# Patient Record
Sex: MALE | Race: ASIAN | NOT HISPANIC OR LATINO | Employment: FULL TIME | URBAN - METROPOLITAN AREA
[De-identification: names, ages, dates, MRNs, and addresses within clinical notes are randomized per-mention and may not be internally consistent; named-entity substitution may affect disease eponyms.]

---

## 2021-06-03 ENCOUNTER — RECORDS - HEALTHEAST (OUTPATIENT)
Dept: ADMINISTRATIVE | Facility: CLINIC | Age: 36
End: 2021-06-03

## 2022-12-10 ENCOUNTER — HOSPITAL ENCOUNTER (EMERGENCY)
Age: 37
Discharge: HOME | End: 2022-12-11
Attending: EMERGENCY MEDICINE

## 2022-12-10 DIAGNOSIS — R00.2 PALPITATIONS: Primary | ICD-10-CM

## 2022-12-10 LAB
ALBUMIN SERPL-MCNC: 4.4 G/DL (ref 3.6–5.1)
ALBUMIN/GLOB SERPL: 1.3 {RATIO} (ref 1–2.4)
ALP SERPL-CCNC: 52 UNITS/L (ref 45–117)
ALT SERPL-CCNC: 33 UNITS/L
ANION GAP SERPL CALC-SCNC: 11 MMOL/L (ref 7–19)
ANION GAP SERPL CALC-SCNC: 14 MMOL/L (ref 7–19)
AST SERPL-CCNC: 17 UNITS/L
ATRIAL RATE (BPM): 105
BASOPHILS # BLD: 0 K/MCL (ref 0–0.3)
BASOPHILS NFR BLD: 0 %
BILIRUB SERPL-MCNC: 0.6 MG/DL (ref 0.2–1)
BUN SERPL-MCNC: 21 MG/DL (ref 6–20)
BUN SERPL-MCNC: 22 MG/DL (ref 6–20)
BUN/CREAT SERPL: 31 (ref 7–25)
BUN/CREAT SERPL: 31 (ref 7–25)
CALCIUM SERPL-MCNC: 8.4 MG/DL (ref 8.4–10.2)
CALCIUM SERPL-MCNC: 8.5 MG/DL (ref 8.4–10.2)
CHLORIDE SERPL-SCNC: 103 MMOL/L (ref 97–110)
CHLORIDE SERPL-SCNC: 105 MMOL/L (ref 97–110)
CO2 SERPL-SCNC: 27 MMOL/L (ref 21–32)
CO2 SERPL-SCNC: 28 MMOL/L (ref 21–32)
CREAT SERPL-MCNC: 0.68 MG/DL (ref 0.67–1.17)
CREAT SERPL-MCNC: 0.7 MG/DL (ref 0.67–1.17)
DEPRECATED RDW RBC: 37.4 FL (ref 39–50)
DIASTOLIC BLOOD PRESSURE: 81
EOSINOPHIL # BLD: 0.2 K/MCL (ref 0–0.5)
EOSINOPHIL NFR BLD: 2 %
ERYTHROCYTE [DISTWIDTH] IN BLOOD: 12.4 % (ref 11–15)
FASTING DURATION TIME PATIENT: ABNORMAL H
FASTING DURATION TIME PATIENT: ABNORMAL H
GFR SERPLBLD BASED ON 1.73 SQ M-ARVRAT: >90 ML/MIN
GFR SERPLBLD BASED ON 1.73 SQ M-ARVRAT: >90 ML/MIN
GLOBULIN SER-MCNC: 3.5 G/DL (ref 2–4)
GLUCOSE SERPL-MCNC: 217 MG/DL (ref 70–99)
GLUCOSE SERPL-MCNC: 230 MG/DL (ref 70–99)
HCT VFR BLD CALC: 46.2 % (ref 39–51)
HGB BLD-MCNC: 15.6 G/DL (ref 13–17)
IMM GRANULOCYTES # BLD AUTO: 0.1 K/MCL (ref 0–0.2)
IMM GRANULOCYTES # BLD: 1 %
LYMPHOCYTES # BLD: 3.3 K/MCL (ref 1–4.8)
LYMPHOCYTES NFR BLD: 48 %
MAGNESIUM SERPL-MCNC: 1.9 MG/DL (ref 1.7–2.4)
MCH RBC QN AUTO: 28.4 PG (ref 26–34)
MCHC RBC AUTO-ENTMCNC: 33.8 G/DL (ref 32–36.5)
MCV RBC AUTO: 84.2 FL (ref 78–100)
MONOCYTES # BLD: 0.4 K/MCL (ref 0.3–0.9)
MONOCYTES NFR BLD: 5 %
NEUTROPHILS # BLD: 3 K/MCL (ref 1.8–7.7)
NEUTROPHILS NFR BLD: 44 %
NRBC BLD MANUAL-RTO: 0 /100 WBC
P AXIS (DEGREES): 45
PLATELET # BLD AUTO: 158 K/MCL (ref 140–450)
POTASSIUM SERPL-SCNC: 2.6 MMOL/L (ref 3.4–5.1)
POTASSIUM SERPL-SCNC: 3.9 MMOL/L (ref 3.4–5.1)
POTASSIUM SERPL-SCNC: 4.2 MMOL/L (ref 3.4–5.1)
PR-INTERVAL (MSEC): 186
PROT SERPL-MCNC: 7.9 G/DL (ref 6.4–8.2)
QRS-INTERVAL (MSEC): 90
QT-INTERVAL (MSEC): 344
QTC: 455
R AXIS (DEGREES): 31
RBC # BLD: 5.49 MIL/MCL (ref 4.5–5.9)
REPORT TEXT: NORMAL
SODIUM SERPL-SCNC: 140 MMOL/L (ref 135–145)
SODIUM SERPL-SCNC: 141 MMOL/L (ref 135–145)
SYSTOLIC BLOOD PRESSURE: 142
T AXIS (DEGREES): 35
TROPONIN I SERPL DL<=0.01 NG/ML-MCNC: 5 NG/L
VENTRICULAR RATE EKG/MIN (BPM): 105
WBC # BLD: 6.8 K/MCL (ref 4.2–11)

## 2022-12-10 PROCEDURE — 93005 ELECTROCARDIOGRAM TRACING: CPT | Performed by: PHYSICIAN ASSISTANT

## 2022-12-10 PROCEDURE — 36415 COLL VENOUS BLD VENIPUNCTURE: CPT | Performed by: PHYSICIAN ASSISTANT

## 2022-12-10 PROCEDURE — 84484 ASSAY OF TROPONIN QUANT: CPT | Performed by: EMERGENCY MEDICINE

## 2022-12-10 PROCEDURE — 85025 COMPLETE CBC W/AUTO DIFF WBC: CPT | Performed by: EMERGENCY MEDICINE

## 2022-12-10 PROCEDURE — 96361 HYDRATE IV INFUSION ADD-ON: CPT

## 2022-12-10 PROCEDURE — 80053 COMPREHEN METABOLIC PANEL: CPT | Performed by: EMERGENCY MEDICINE

## 2022-12-10 PROCEDURE — 83735 ASSAY OF MAGNESIUM: CPT | Performed by: PHYSICIAN ASSISTANT

## 2022-12-10 PROCEDURE — 84132 ASSAY OF SERUM POTASSIUM: CPT | Performed by: PHYSICIAN ASSISTANT

## 2022-12-10 PROCEDURE — 10002807 HB RX 258: Performed by: PHYSICIAN ASSISTANT

## 2022-12-10 PROCEDURE — 36415 COLL VENOUS BLD VENIPUNCTURE: CPT

## 2022-12-10 PROCEDURE — 96360 HYDRATION IV INFUSION INIT: CPT

## 2022-12-10 PROCEDURE — 80048 BASIC METABOLIC PNL TOTAL CA: CPT | Performed by: PHYSICIAN ASSISTANT

## 2022-12-10 PROCEDURE — 99285 EMERGENCY DEPT VISIT HI MDM: CPT

## 2022-12-10 RX ORDER — SODIUM CHLORIDE AND POTASSIUM CHLORIDE 150; 900 MG/100ML; MG/100ML
INJECTION, SOLUTION INTRAVENOUS ONCE
Status: DISCONTINUED | OUTPATIENT
Start: 2022-12-10 | End: 2022-12-11 | Stop reason: HOSPADM

## 2022-12-10 RX ADMIN — SODIUM CHLORIDE 1000 ML: 9 INJECTION, SOLUTION INTRAVENOUS at 21:20

## 2022-12-10 ASSESSMENT — PAIN DESCRIPTION - PAIN TYPE: TYPE: CHEST PAIN

## 2022-12-10 ASSESSMENT — ENCOUNTER SYMPTOMS
HEADACHES: 0
VOMITING: 0
ABDOMINAL DISTENTION: 0
DIARRHEA: 0
SORE THROAT: 0
BACK PAIN: 0
DIZZINESS: 0
COUGH: 0
TROUBLE SWALLOWING: 0
SHORTNESS OF BREATH: 0
FEVER: 0
NAUSEA: 0
LIGHT-HEADEDNESS: 0
PHOTOPHOBIA: 0
ABDOMINAL PAIN: 0

## 2022-12-10 ASSESSMENT — PAIN SCALES - GENERAL: PAINLEVEL_OUTOF10: 8

## 2022-12-11 VITALS
TEMPERATURE: 97.3 F | HEART RATE: 92 BPM | RESPIRATION RATE: 14 BRPM | OXYGEN SATURATION: 98 % | SYSTOLIC BLOOD PRESSURE: 136 MMHG | DIASTOLIC BLOOD PRESSURE: 82 MMHG

## 2022-12-11 LAB
RAINBOW EXTRA TUBES HOLD SPECIMEN: NORMAL

## 2023-12-29 ENCOUNTER — APPOINTMENT (OUTPATIENT)
Dept: CT IMAGING | Facility: HOSPITAL | Age: 38
End: 2023-12-29
Attending: EMERGENCY MEDICINE
Payer: COMMERCIAL

## 2023-12-29 ENCOUNTER — HOSPITAL ENCOUNTER (EMERGENCY)
Facility: HOSPITAL | Age: 38
Discharge: HOME | End: 2023-12-29
Attending: EMERGENCY MEDICINE | Admitting: EMERGENCY MEDICINE
Payer: COMMERCIAL

## 2023-12-29 VITALS
TEMPERATURE: 97.3 F | DIASTOLIC BLOOD PRESSURE: 86 MMHG | BODY MASS INDEX: 25.23 KG/M2 | WEIGHT: 157 LBS | HEIGHT: 66 IN | OXYGEN SATURATION: 99 % | HEART RATE: 82 BPM | SYSTOLIC BLOOD PRESSURE: 144 MMHG | RESPIRATION RATE: 16 BRPM

## 2023-12-29 DIAGNOSIS — K61.1 PERIRECTAL ABSCESS: ICD-10-CM

## 2023-12-29 LAB
ANION GAP SERPL CALCULATED.3IONS-SCNC: 11 MMOL/L (ref 7–15)
BUN SERPL-MCNC: 13.6 MG/DL (ref 6–20)
CALCIUM SERPL-MCNC: 9.3 MG/DL (ref 8.6–10)
CHLORIDE SERPL-SCNC: 100 MMOL/L (ref 98–107)
CREAT SERPL-MCNC: 0.61 MG/DL (ref 0.67–1.17)
DEPRECATED HCO3 PLAS-SCNC: 26 MMOL/L (ref 22–29)
EGFRCR SERPLBLD CKD-EPI 2021: >90 ML/MIN/1.73M2
ERYTHROCYTE [DISTWIDTH] IN BLOOD BY AUTOMATED COUNT: 11.9 % (ref 10–15)
GLUCOSE SERPL-MCNC: 264 MG/DL (ref 70–99)
HCT VFR BLD AUTO: 46.9 % (ref 40–53)
HGB BLD-MCNC: 16.4 G/DL (ref 13.3–17.7)
LACTATE SERPL-SCNC: 1.1 MMOL/L (ref 0.7–2)
MCH RBC QN AUTO: 28.3 PG (ref 26.5–33)
MCHC RBC AUTO-ENTMCNC: 35 G/DL (ref 31.5–36.5)
MCV RBC AUTO: 81 FL (ref 78–100)
PLATELET # BLD AUTO: 163 10E3/UL (ref 150–450)
POTASSIUM SERPL-SCNC: 3.7 MMOL/L (ref 3.4–5.3)
RBC # BLD AUTO: 5.8 10E6/UL (ref 4.4–5.9)
SODIUM SERPL-SCNC: 137 MMOL/L (ref 135–145)
WBC # BLD AUTO: 8.1 10E3/UL (ref 4–11)

## 2023-12-29 PROCEDURE — 72193 CT PELVIS W/DYE: CPT

## 2023-12-29 PROCEDURE — 83605 ASSAY OF LACTIC ACID: CPT | Performed by: EMERGENCY MEDICINE

## 2023-12-29 PROCEDURE — 258N000003 HC RX IP 258 OP 636: Performed by: EMERGENCY MEDICINE

## 2023-12-29 PROCEDURE — 250N000013 HC RX MED GY IP 250 OP 250 PS 637: Performed by: EMERGENCY MEDICINE

## 2023-12-29 PROCEDURE — 36415 COLL VENOUS BLD VENIPUNCTURE: CPT | Performed by: EMERGENCY MEDICINE

## 2023-12-29 PROCEDURE — 99285 EMERGENCY DEPT VISIT HI MDM: CPT | Mod: 25

## 2023-12-29 PROCEDURE — 250N000011 HC RX IP 250 OP 636: Performed by: EMERGENCY MEDICINE

## 2023-12-29 PROCEDURE — 80048 BASIC METABOLIC PNL TOTAL CA: CPT | Performed by: EMERGENCY MEDICINE

## 2023-12-29 PROCEDURE — 85027 COMPLETE CBC AUTOMATED: CPT | Performed by: EMERGENCY MEDICINE

## 2023-12-29 RX ORDER — IBUPROFEN 600 MG/1
600 TABLET, FILM COATED ORAL EVERY 8 HOURS PRN
Qty: 30 TABLET | Refills: 0 | Status: SHIPPED | OUTPATIENT
Start: 2023-12-29

## 2023-12-29 RX ORDER — IOPAMIDOL 755 MG/ML
90 INJECTION, SOLUTION INTRAVASCULAR ONCE
Status: COMPLETED | OUTPATIENT
Start: 2023-12-29 | End: 2023-12-29

## 2023-12-29 RX ORDER — LEVOFLOXACIN 500 MG/1
500 TABLET, FILM COATED ORAL ONCE
Status: DISCONTINUED | OUTPATIENT
Start: 2023-12-29 | End: 2023-12-29

## 2023-12-29 RX ORDER — AMOXICILLIN 250 MG
1 CAPSULE ORAL 2 TIMES DAILY
Qty: 15 TABLET | Refills: 0 | Status: SHIPPED | OUTPATIENT
Start: 2023-12-29 | End: 2024-01-06

## 2023-12-29 RX ORDER — HYDROCODONE BITARTRATE AND ACETAMINOPHEN 5; 325 MG/1; MG/1
1 TABLET ORAL EVERY 6 HOURS PRN
Qty: 10 TABLET | Refills: 0 | Status: ON HOLD | OUTPATIENT
Start: 2023-12-29 | End: 2023-12-30

## 2023-12-29 RX ORDER — MORPHINE SULFATE 4 MG/ML
4 INJECTION, SOLUTION INTRAMUSCULAR; INTRAVENOUS ONCE
Status: COMPLETED | OUTPATIENT
Start: 2023-12-29 | End: 2023-12-29

## 2023-12-29 RX ADMIN — IOPAMIDOL 90 ML: 755 INJECTION, SOLUTION INTRAVENOUS at 04:01

## 2023-12-29 RX ADMIN — SODIUM CHLORIDE 1000 ML: 9 INJECTION, SOLUTION INTRAVENOUS at 03:29

## 2023-12-29 RX ADMIN — MORPHINE SULFATE 4 MG: 4 INJECTION, SOLUTION INTRAMUSCULAR; INTRAVENOUS at 03:31

## 2023-12-29 RX ADMIN — AMOXICILLIN AND CLAVULANATE POTASSIUM 1 TABLET: 875; 125 TABLET, FILM COATED ORAL at 05:08

## 2023-12-29 ASSESSMENT — ACTIVITIES OF DAILY LIVING (ADL)
ADLS_ACUITY_SCORE: 33
ADLS_ACUITY_SCORE: 35

## 2023-12-29 NOTE — ED PROVIDER NOTES
"EMERGENCY DEPARTMENT ENCOUNTER      NAME: Celia Telles  AGE: 38 year old male  YOB: 1985  MRN: [id number]  EVALUATION DATE & TIME: 12/29/2023  2:57 AM    PCP: No primary care provider on file.    ED PROVIDER: Dar Ferreira M.D.      Chief Complaint   Patient presents with    Rectal/perineal Pain         FINAL IMPRESSION:  Perirectal abscess      ED COURSE & MEDICAL DECISION MAKING:    Pertinent Labs & Imaging studies reviewed. (See chart for details)  38 year old male presents to the Emergency Department for evaluation of severe perirectal discomfort.  Symptoms ongoing for a number days.  Patient reports he had a \"golf ball sized\" swollen area which his wife punctured.  He states it drained a lot of blood and it felt improved only to have pain worsening over the last few days once again.  Patient states is very intense when he tries to sit in stool.  Reports only small amount of blood with stooling.  Does report a history of constipation.  On exam he is in moderate distress and appears to be quite uncomfortable.  Abdomen is soft and nontender respirations unlabored.  Perirectal exam reveals marked induration and tenderness along the left perirectal verge.  No obvious fluctuance.  No evidence of external hemorrhoids.  Findings consistent with perirectal abscess.  Given the extent of discomfort we will proceed with laboratory evaluation and imaging to determine need for potential surgical intervention/drainage.  Patient treated symptomatically with intravenous normal saline and morphine.. Patient appears non toxic with stable vitals signs.    3:14 AM  I met with the patient for the initial interview and physical examination. Discussed plan for treatment and workup in the ED.    4:48 AM.  Basic metabolic, CBC and lactic acid essentially unremarkable.  CT of the pelvis reveals approximately 1 x 2 cm perirectal abscess in the area of tenderness consistent with his exam.  This is deemed too small for drainage.  " Initial dose of antibiotics, Augmentin given.  The need for sitz bath's, pain management and antibiotics discussed at length.  Patient has any significant worsening he needs to return immediately.  Understands as well.  Patient ultimately placed on stool softeners to avoid constipation   at the conclusion of the encounter I discussed the results of all of the tests and the disposition. The questions were answered and return precautions provided. The patient or family acknowledged understanding and was agreeable with the care plan.     Medical Decision Making    History:  Supplemental history from: Documented in chart, if applicable  External Record(s) reviewed: Documented in chart, if applicable.    Work Up:  Chart documentation includes differential considered and any EKGs or imaging independently interpreted by provider, where specified.  In additional to work up documented, I considered the following work up: Documented in chart, if applicable.    External consultation:  Discussion of management with another provider: Documented in chart, if applicable    Complicating factors:  Care impacted by chronic illness: Diabetes  Care affected by social determinants of health: N/A    Disposition considerations: Discharge. I prescribed additional prescription strength medication(s) as charted. See documentation for any additional details.     PPE: Provider wore  paper mask.     MEDICATIONS GIVEN IN THE EMERGENCY:  Medications - No data to display    NEW PRESCRIPTIONS STARTED AT TODAY'S ER VISIT  Current Discharge Medication List        START taking these medications    Details   amoxicillin-clavulanate (AUGMENTIN) 875-125 MG tablet Take 1 tablet by mouth 2 times daily for 7 days  Qty: 14 tablet, Refills: 0      HYDROcodone-acetaminophen (NORCO) 5-325 MG tablet Take 1 tablet by mouth every 6 hours as needed  Qty: 10 tablet, Refills: 0      ibuprofen (ADVIL/MOTRIN) 600 MG tablet Take 1 tablet (600 mg) by mouth every 8 hours  "as needed  Qty: 30 tablet, Refills: 0      senna-docusate (SENOKOT-S/PERICOLACE) 8.6-50 MG tablet Take 1 tablet by mouth 2 times daily for 15 doses  Qty: 15 tablet, Refills: 0                 =================================================================    HPI    Patient information was obtained from: patient     Use of Intrepreter: N/A         Celia Telles is a 38 year old male with a pertient medical history of diabetes mellitus who presents to the ED for evaluation of rectal pain.     Patient reports 2 weeks of constant rectal pain with blood in his stool occasionally. Endorses feeling constipated. He has a history of external hemorrhoids, but usually they go away in 1 week. He has been using OTC medications with no relief.     REVIEW OF SYSTEMS   Constitutional:  Denies fever, chills  Respiratory:  Denies productive cough or increased work of breathing  Cardiovascular:  Denies chest pain, palpitations  GI:  Denies abdominal pain, nausea, vomiting, or change in bowel or bladder habits   Musculoskeletal:  Denies any new muscle/joint swelling  Skin:  Denies rash   Neurologic:  Denies focal weakness  All systems negative except as marked.     PAST MEDICAL HISTORY:  No past medical history on file.    PAST SURGICAL HISTORY:  No past surgical history on file.      CURRENT MEDICATIONS:    No current facility-administered medications for this encounter.  No current outpatient medications on file.    ALLERGIES:  No Known Allergies    FAMILY HISTORY:  No family history on file.    SOCIAL HISTORY:   Social History     Socioeconomic History    Marital status:        VITALS:  Patient Vitals for the past 24 hrs:   BP Temp Temp src Pulse Resp SpO2 Height Weight   12/29/23 0254 (!) 148/90 97.3  F (36.3  C) Temporal 82 16 98 % 1.676 m (5' 6\") 71.2 kg (157 lb)        PHYSICAL EXAM    Constitutional:  Awake, alert, in moderate apparent distress  HENT:  Normocephalic, Atraumatic. Bilateral external ears normal. Oropharynx " moist. Nose normal. Neck- Normal range of motion with no guarding, No midline cervical tenderness, Supple, No stridor.   Eyes:  PERRL, EOMI with no signs of entrapment, Conjunctiva normal, No discharge.   Respiratory:  Normal breath sounds, No respiratory distress, No wheezing.    Cardiovascular:  Normal heart rate, Normal rhythm, No appreciable rubs or gallops.   GI:  Soft, No tenderness, No distension, No palpable masses  : No external hemorrhoids. Area of firmness and exquisite tenderness on let-side perirectal region.  No fluctuance  Musculoskeletal:  No edema. Good range of motion in all major joints. No tenderness to palpation or major deformities noted.  Integument:  Warm, Dry, No erythema, No rash.   Neurologic:  Alert & oriented, Normal motor function, Normal sensory function, No focal deficits noted.   Psychiatric:  Affect normal, Judgment normal, Mood normal.   No external   Area firmness and tenderness left side perirectal region   LAB:  All pertinent labs reviewed and interpreted.     Results for orders placed or performed during the hospital encounter of 12/29/23   CT Pelvis Soft Tissue w Contrast     Status: None    Narrative    EXAM: CT PELVIS SOFT TISSUE W CONTRAST  LOCATION: [facility]  DATE: 12/29/2023    INDICATION: Perirectal abscess questionable    COMPARISON: None.  TECHNIQUE: CT scan of the pelvis was performed with IV contrast. Multiplanar reformats were obtained. Dose reduction techniques were used.  CONTRAST: 90ml Isovue 370    FINDINGS:    PELVIC ORGANS: Bladder is within normal limits. Visualized bowel is normal in caliber with no evidence for obstruction. Normal appendix. No intrapelvic masses or fluid collection.    There is a peripherally enhancing perianal fluid collection just to the left of midline near the anal opening measuring 1.8 x 1.0 x 1.4 cm (AP x transverse x SI). This is compatible with a perianal abscess. Cannot exclude fistulous communication  of the   anal canal. Subcutaneous thickening and induration along the gluteal fold on the left posterior to the abscess. No other fluid collections seen.    MUSCULOSKELETAL: No significant bony abnormalities.      Impression    IMPRESSION:  1.  1.8 cm perianal abscess just to the left of midline near the anal opening. Cannot exclude underlying fistulous communication to the internal canal. MRI could better evaluate if clinically warranted. There is some nonspecific subcutaneous thickening   and induration extending posteriorly along the left gluteal fold. No other fluid collections seen.    2.  Remainder of the exam is unremarkable.             Lactic acid whole blood     Status: Normal   Result Value Ref Range    Lactic Acid 1.1 0.7 - 2.0 mmol/L   Basic metabolic panel     Status: Abnormal   Result Value Ref Range    Sodium 137 135 - 145 mmol/L    Potassium 3.7 3.4 - 5.3 mmol/L    Chloride 100 98 - 107 mmol/L    Carbon Dioxide (CO2) 26 22 - 29 mmol/L    Anion Gap 11 7 - 15 mmol/L    Urea Nitrogen 13.6 6.0 - 20.0 mg/dL    Creatinine 0.61 (L) 0.67 - 1.17 mg/dL    GFR Estimate >90 >60 mL/min/1.73m2    Calcium 9.3 8.6 - 10.0 mg/dL    Glucose 264 (H) 70 - 99 mg/dL   CBC (+ platelets, no diff)     Status: Normal   Result Value Ref Range    WBC Count 8.1 4.0 - 11.0 10e3/uL    RBC Count 5.80 4.40 - 5.90 10e6/uL    Hemoglobin 16.4 13.3 - 17.7 g/dL    Hematocrit 46.9 40.0 - 53.0 %    MCV 81 78 - 100 fL    MCH 28.3 26.5 - 33.0 pg    MCHC 35.0 31.5 - 36.5 g/dL    RDW 11.9 10.0 - 15.0 %    Platelet Count 163 150 - 450 10e3/uL      RADIOLOGY:  Reviewed all pertinent imaging. Please see official radiology report.  No orders to display       PROCEDURES:   N/A        I, Criss Gonzalez, am serving as a scribe to document services personally performed by Dar Ferreira MD, based on my observation and the provider's statements to me. I, Dar Ferreira MD attest that Criss Gonzalez is acting in a scribe capacity, has observed my  performance of the services and has documented them in accordance with my direction.    Dar Ferreira M.D.  Emergency Medicine  [facility] EMERGENCY DEPARTMENT     Dar Ferreira MD  12/29/23 8702

## 2023-12-29 NOTE — ED TRIAGE NOTES
Patient arrives from home.   Reports having rectal pain x1 week which is making it difficult for him to walk.      States he does have internal hemorrhoids.

## 2023-12-30 ENCOUNTER — ANESTHESIA (OUTPATIENT)
Dept: SURGERY | Facility: HOSPITAL | Age: 38
End: 2023-12-30
Payer: COMMERCIAL

## 2023-12-30 ENCOUNTER — ANESTHESIA EVENT (OUTPATIENT)
Dept: SURGERY | Facility: HOSPITAL | Age: 38
End: 2023-12-30
Payer: COMMERCIAL

## 2023-12-30 ENCOUNTER — HOSPITAL ENCOUNTER (OUTPATIENT)
Facility: HOSPITAL | Age: 38
Discharge: HOME | End: 2023-12-30
Attending: EMERGENCY MEDICINE | Admitting: COLON & RECTAL SURGERY
Payer: COMMERCIAL

## 2023-12-30 VITALS
HEIGHT: 66 IN | SYSTOLIC BLOOD PRESSURE: 134 MMHG | TEMPERATURE: 99.2 F | HEART RATE: 75 BPM | BODY MASS INDEX: 25.23 KG/M2 | OXYGEN SATURATION: 94 % | DIASTOLIC BLOOD PRESSURE: 93 MMHG | WEIGHT: 157 LBS | RESPIRATION RATE: 21 BRPM

## 2023-12-30 DIAGNOSIS — K61.1 RECTAL ABSCESS: ICD-10-CM

## 2023-12-30 DIAGNOSIS — K61.0 PERIANAL ABSCESS: Primary | ICD-10-CM

## 2023-12-30 DIAGNOSIS — Z86.39 HISTORY OF DIABETES MELLITUS: ICD-10-CM

## 2023-12-30 LAB
ANION GAP SERPL CALCULATED.3IONS-SCNC: 10 MMOL/L (ref 7–15)
BASOPHILS # BLD AUTO: 0 10E3/UL (ref 0–0.2)
BASOPHILS NFR BLD AUTO: 0 %
BUN SERPL-MCNC: 16.3 MG/DL (ref 6–20)
CALCIUM SERPL-MCNC: 9.3 MG/DL (ref 8.6–10)
CHLORIDE SERPL-SCNC: 99 MMOL/L (ref 98–107)
CREAT SERPL-MCNC: 0.73 MG/DL (ref 0.67–1.17)
CRP SERPL-MCNC: 23.7 MG/L
DEPRECATED HCO3 PLAS-SCNC: 27 MMOL/L (ref 22–29)
EGFRCR SERPLBLD CKD-EPI 2021: >90 ML/MIN/1.73M2
EOSINOPHIL # BLD AUTO: 0.1 10E3/UL (ref 0–0.7)
EOSINOPHIL NFR BLD AUTO: 2 %
ERYTHROCYTE [DISTWIDTH] IN BLOOD BY AUTOMATED COUNT: 11.9 % (ref 10–15)
GLUCOSE BLDC GLUCOMTR-MCNC: 185 MG/DL (ref 70–99)
GLUCOSE BLDC GLUCOMTR-MCNC: 193 MG/DL (ref 70–99)
GLUCOSE SERPL-MCNC: 221 MG/DL (ref 70–99)
HCT VFR BLD AUTO: 49.3 % (ref 40–53)
HGB BLD-MCNC: 16.9 G/DL (ref 13.3–17.7)
IMM GRANULOCYTES # BLD: 0.1 10E3/UL
IMM GRANULOCYTES NFR BLD: 1 %
LACTATE SERPL-SCNC: 1 MMOL/L (ref 0.7–2)
LYMPHOCYTES # BLD AUTO: 1.6 10E3/UL (ref 0.8–5.3)
LYMPHOCYTES NFR BLD AUTO: 18 %
MCH RBC QN AUTO: 28.1 PG (ref 26.5–33)
MCHC RBC AUTO-ENTMCNC: 34.3 G/DL (ref 31.5–36.5)
MCV RBC AUTO: 82 FL (ref 78–100)
MONOCYTES # BLD AUTO: 0.6 10E3/UL (ref 0–1.3)
MONOCYTES NFR BLD AUTO: 7 %
NEUTROPHILS # BLD AUTO: 6.2 10E3/UL (ref 1.6–8.3)
NEUTROPHILS NFR BLD AUTO: 72 %
NRBC # BLD AUTO: 0 10E3/UL
NRBC BLD AUTO-RTO: 0 /100
PLATELET # BLD AUTO: 176 10E3/UL (ref 150–450)
POTASSIUM SERPL-SCNC: 4.1 MMOL/L (ref 3.4–5.3)
RBC # BLD AUTO: 6.02 10E6/UL (ref 4.4–5.9)
SODIUM SERPL-SCNC: 136 MMOL/L (ref 135–145)
WBC # BLD AUTO: 8.6 10E3/UL (ref 4–11)

## 2023-12-30 PROCEDURE — 83605 ASSAY OF LACTIC ACID: CPT | Performed by: EMERGENCY MEDICINE

## 2023-12-30 PROCEDURE — 360N000075 HC SURGERY LEVEL 2, PER MIN: Performed by: COLON & RECTAL SURGERY

## 2023-12-30 PROCEDURE — 272N000001 HC OR GENERAL SUPPLY STERILE: Performed by: COLON & RECTAL SURGERY

## 2023-12-30 PROCEDURE — 250N000011 HC RX IP 250 OP 636: Performed by: COLON & RECTAL SURGERY

## 2023-12-30 PROCEDURE — 999N000141 HC STATISTIC PRE-PROCEDURE NURSING ASSESSMENT: Performed by: COLON & RECTAL SURGERY

## 2023-12-30 PROCEDURE — 370N000017 HC ANESTHESIA TECHNICAL FEE, PER MIN: Performed by: COLON & RECTAL SURGERY

## 2023-12-30 PROCEDURE — 99285 EMERGENCY DEPT VISIT HI MDM: CPT | Mod: 25

## 2023-12-30 PROCEDURE — 85025 COMPLETE CBC W/AUTO DIFF WBC: CPT | Performed by: EMERGENCY MEDICINE

## 2023-12-30 PROCEDURE — 250N000009 HC RX 250: Performed by: STUDENT IN AN ORGANIZED HEALTH CARE EDUCATION/TRAINING PROGRAM

## 2023-12-30 PROCEDURE — 250N000011 HC RX IP 250 OP 636: Performed by: ANESTHESIOLOGY

## 2023-12-30 PROCEDURE — 250N000009 HC RX 250: Performed by: COLON & RECTAL SURGERY

## 2023-12-30 PROCEDURE — 250N000011 HC RX IP 250 OP 636: Performed by: STUDENT IN AN ORGANIZED HEALTH CARE EDUCATION/TRAINING PROGRAM

## 2023-12-30 PROCEDURE — 250N000013 HC RX MED GY IP 250 OP 250 PS 637: Performed by: ANESTHESIOLOGY

## 2023-12-30 PROCEDURE — 80048 BASIC METABOLIC PNL TOTAL CA: CPT | Performed by: EMERGENCY MEDICINE

## 2023-12-30 PROCEDURE — 258N000003 HC RX IP 258 OP 636: Performed by: EMERGENCY MEDICINE

## 2023-12-30 PROCEDURE — 250N000009 HC RX 250: Performed by: EMERGENCY MEDICINE

## 2023-12-30 PROCEDURE — 258N000003 HC RX IP 258 OP 636: Performed by: ANESTHESIOLOGY

## 2023-12-30 PROCEDURE — 96367 TX/PROPH/DG ADDL SEQ IV INF: CPT

## 2023-12-30 PROCEDURE — 250N000011 HC RX IP 250 OP 636: Performed by: SURGERY

## 2023-12-30 PROCEDURE — 710N000009 HC RECOVERY PHASE 1, LEVEL 1, PER MIN: Performed by: COLON & RECTAL SURGERY

## 2023-12-30 PROCEDURE — 36415 COLL VENOUS BLD VENIPUNCTURE: CPT | Performed by: EMERGENCY MEDICINE

## 2023-12-30 PROCEDURE — 250N000011 HC RX IP 250 OP 636: Performed by: EMERGENCY MEDICINE

## 2023-12-30 PROCEDURE — 96365 THER/PROPH/DIAG IV INF INIT: CPT | Mod: 59

## 2023-12-30 PROCEDURE — 710N000012 HC RECOVERY PHASE 2, PER MINUTE: Performed by: COLON & RECTAL SURGERY

## 2023-12-30 PROCEDURE — 86140 C-REACTIVE PROTEIN: CPT | Performed by: EMERGENCY MEDICINE

## 2023-12-30 PROCEDURE — 250N000025 HC SEVOFLURANE, PER MIN: Performed by: COLON & RECTAL SURGERY

## 2023-12-30 RX ORDER — HALOPERIDOL 5 MG/ML
1 INJECTION INTRAMUSCULAR
Status: DISCONTINUED | OUTPATIENT
Start: 2023-12-30 | End: 2023-12-30 | Stop reason: HOSPADM

## 2023-12-30 RX ORDER — LIDOCAINE HYDROCHLORIDE 20 MG/ML
10 JELLY TOPICAL ONCE
Status: COMPLETED | OUTPATIENT
Start: 2023-12-30 | End: 2023-12-30

## 2023-12-30 RX ORDER — ONDANSETRON 4 MG/1
4 TABLET, ORALLY DISINTEGRATING ORAL EVERY 30 MIN PRN
Status: DISCONTINUED | OUTPATIENT
Start: 2023-12-30 | End: 2023-12-30 | Stop reason: HOSPADM

## 2023-12-30 RX ORDER — ALBUTEROL SULFATE 0.83 MG/ML
2.5 SOLUTION RESPIRATORY (INHALATION) EVERY 4 HOURS PRN
Status: DISCONTINUED | OUTPATIENT
Start: 2023-12-30 | End: 2023-12-30 | Stop reason: HOSPADM

## 2023-12-30 RX ORDER — HYDROMORPHONE HYDROCHLORIDE 1 MG/ML
0.2 INJECTION, SOLUTION INTRAMUSCULAR; INTRAVENOUS; SUBCUTANEOUS ONCE
Status: COMPLETED | OUTPATIENT
Start: 2023-12-30 | End: 2023-12-30

## 2023-12-30 RX ORDER — NALOXONE HYDROCHLORIDE 0.4 MG/ML
0.4 INJECTION, SOLUTION INTRAMUSCULAR; INTRAVENOUS; SUBCUTANEOUS
Status: DISCONTINUED | OUTPATIENT
Start: 2023-12-30 | End: 2023-12-30 | Stop reason: HOSPADM

## 2023-12-30 RX ORDER — NALOXONE HYDROCHLORIDE 0.4 MG/ML
0.2 INJECTION, SOLUTION INTRAMUSCULAR; INTRAVENOUS; SUBCUTANEOUS
Status: DISCONTINUED | OUTPATIENT
Start: 2023-12-30 | End: 2023-12-30 | Stop reason: HOSPADM

## 2023-12-30 RX ORDER — MAGNESIUM HYDROXIDE 1200 MG/15ML
LIQUID ORAL PRN
Status: DISCONTINUED | OUTPATIENT
Start: 2023-12-30 | End: 2023-12-30 | Stop reason: HOSPADM

## 2023-12-30 RX ORDER — LIDOCAINE HYDROCHLORIDE 10 MG/ML
INJECTION, SOLUTION INFILTRATION; PERINEURAL PRN
Status: DISCONTINUED | OUTPATIENT
Start: 2023-12-30 | End: 2023-12-30

## 2023-12-30 RX ORDER — FENTANYL CITRATE 50 UG/ML
25 INJECTION, SOLUTION INTRAMUSCULAR; INTRAVENOUS
Status: DISCONTINUED | OUTPATIENT
Start: 2023-12-30 | End: 2023-12-30 | Stop reason: HOSPADM

## 2023-12-30 RX ORDER — ONDANSETRON 2 MG/ML
4 INJECTION INTRAMUSCULAR; INTRAVENOUS EVERY 30 MIN PRN
Status: DISCONTINUED | OUTPATIENT
Start: 2023-12-30 | End: 2023-12-30 | Stop reason: HOSPADM

## 2023-12-30 RX ORDER — SODIUM CHLORIDE 9 MG/ML
INJECTION, SOLUTION INTRAVENOUS ONCE
Status: COMPLETED | OUTPATIENT
Start: 2023-12-30 | End: 2023-12-30

## 2023-12-30 RX ORDER — ONDANSETRON 2 MG/ML
4 INJECTION INTRAMUSCULAR; INTRAVENOUS ONCE
Status: COMPLETED | OUTPATIENT
Start: 2023-12-30 | End: 2023-12-30

## 2023-12-30 RX ORDER — METRONIDAZOLE 500 MG/100ML
500 INJECTION, SOLUTION INTRAVENOUS ONCE
Qty: 100 ML | Refills: 0 | Status: COMPLETED | OUTPATIENT
Start: 2023-12-30 | End: 2023-12-30

## 2023-12-30 RX ORDER — CEFTRIAXONE 1 G/1
1 INJECTION, POWDER, FOR SOLUTION INTRAMUSCULAR; INTRAVENOUS ONCE
Qty: 10 ML | Refills: 0 | Status: COMPLETED | OUTPATIENT
Start: 2023-12-30 | End: 2023-12-30

## 2023-12-30 RX ORDER — FENTANYL CITRATE 50 UG/ML
INJECTION, SOLUTION INTRAMUSCULAR; INTRAVENOUS PRN
Status: DISCONTINUED | OUTPATIENT
Start: 2023-12-30 | End: 2023-12-30

## 2023-12-30 RX ORDER — ONDANSETRON 4 MG/1
4 TABLET, ORALLY DISINTEGRATING ORAL
Status: DISCONTINUED | OUTPATIENT
Start: 2023-12-30 | End: 2023-12-30 | Stop reason: HOSPADM

## 2023-12-30 RX ORDER — HYDROMORPHONE HYDROCHLORIDE 1 MG/ML
0.4 INJECTION, SOLUTION INTRAMUSCULAR; INTRAVENOUS; SUBCUTANEOUS EVERY 5 MIN PRN
Status: DISCONTINUED | OUTPATIENT
Start: 2023-12-30 | End: 2023-12-30 | Stop reason: HOSPADM

## 2023-12-30 RX ORDER — SODIUM CHLORIDE, SODIUM LACTATE, POTASSIUM CHLORIDE, CALCIUM CHLORIDE 600; 310; 30; 20 MG/100ML; MG/100ML; MG/100ML; MG/100ML
INJECTION, SOLUTION INTRAVENOUS CONTINUOUS
Status: DISCONTINUED | OUTPATIENT
Start: 2023-12-30 | End: 2023-12-30 | Stop reason: HOSPADM

## 2023-12-30 RX ORDER — ONDANSETRON 2 MG/ML
INJECTION INTRAMUSCULAR; INTRAVENOUS PRN
Status: DISCONTINUED | OUTPATIENT
Start: 2023-12-30 | End: 2023-12-30

## 2023-12-30 RX ORDER — DEXAMETHASONE SODIUM PHOSPHATE 4 MG/ML
INJECTION, SOLUTION INTRA-ARTICULAR; INTRALESIONAL; INTRAMUSCULAR; INTRAVENOUS; SOFT TISSUE PRN
Status: DISCONTINUED | OUTPATIENT
Start: 2023-12-30 | End: 2023-12-30

## 2023-12-30 RX ORDER — BUPIVACAINE HYDROCHLORIDE 2.5 MG/ML
INJECTION, SOLUTION INFILTRATION; PERINEURAL PRN
Status: DISCONTINUED | OUTPATIENT
Start: 2023-12-30 | End: 2023-12-30 | Stop reason: HOSPADM

## 2023-12-30 RX ORDER — HYDROCODONE BITARTRATE AND ACETAMINOPHEN 5; 325 MG/1; MG/1
1 TABLET ORAL EVERY 6 HOURS PRN
Qty: 5 TABLET | Refills: 0 | Status: SHIPPED | OUTPATIENT
Start: 2023-12-30

## 2023-12-30 RX ORDER — ACETAMINOPHEN 325 MG/1
650 TABLET ORAL
Status: DISCONTINUED | OUTPATIENT
Start: 2023-12-30 | End: 2023-12-30 | Stop reason: HOSPADM

## 2023-12-30 RX ORDER — OXYCODONE HYDROCHLORIDE 5 MG/1
5 TABLET ORAL
Status: COMPLETED | OUTPATIENT
Start: 2023-12-30 | End: 2023-12-30

## 2023-12-30 RX ORDER — FENTANYL CITRATE 50 UG/ML
25 INJECTION, SOLUTION INTRAMUSCULAR; INTRAVENOUS EVERY 5 MIN PRN
Status: DISCONTINUED | OUTPATIENT
Start: 2023-12-30 | End: 2023-12-30 | Stop reason: HOSPADM

## 2023-12-30 RX ORDER — PROPOFOL 10 MG/ML
INJECTION, EMULSION INTRAVENOUS PRN
Status: DISCONTINUED | OUTPATIENT
Start: 2023-12-30 | End: 2023-12-30

## 2023-12-30 RX ADMIN — CEFTRIAXONE SODIUM 1 G: 1 INJECTION, POWDER, FOR SOLUTION INTRAMUSCULAR; INTRAVENOUS at 10:55

## 2023-12-30 RX ADMIN — FENTANYL CITRATE 100 MCG: 50 INJECTION INTRAMUSCULAR; INTRAVENOUS at 15:04

## 2023-12-30 RX ADMIN — DEXAMETHASONE SODIUM PHOSPHATE 10 MG: 4 INJECTION, SOLUTION INTRA-ARTICULAR; INTRALESIONAL; INTRAMUSCULAR; INTRAVENOUS; SOFT TISSUE at 15:21

## 2023-12-30 RX ADMIN — SODIUM CHLORIDE, POTASSIUM CHLORIDE, SODIUM LACTATE AND CALCIUM CHLORIDE: 600; 310; 30; 20 INJECTION, SOLUTION INTRAVENOUS at 14:59

## 2023-12-30 RX ADMIN — LIDOCAINE HYDROCHLORIDE 10 ML: 20 JELLY TOPICAL at 10:01

## 2023-12-30 RX ADMIN — ONDANSETRON 4 MG: 2 INJECTION INTRAMUSCULAR; INTRAVENOUS at 15:21

## 2023-12-30 RX ADMIN — SODIUM CHLORIDE: 9 INJECTION, SOLUTION INTRAVENOUS at 10:47

## 2023-12-30 RX ADMIN — PROPOFOL 50 MG: 10 INJECTION, EMULSION INTRAVENOUS at 15:11

## 2023-12-30 RX ADMIN — ONDANSETRON 4 MG: 2 INJECTION INTRAMUSCULAR; INTRAVENOUS at 14:43

## 2023-12-30 RX ADMIN — LIDOCAINE HYDROCHLORIDE 50 ML: 10 INJECTION, SOLUTION INFILTRATION; PERINEURAL at 15:04

## 2023-12-30 RX ADMIN — METRONIDAZOLE 500 MG: 500 INJECTION, SOLUTION INTRAVENOUS at 11:28

## 2023-12-30 RX ADMIN — PROPOFOL 150 MG: 10 INJECTION, EMULSION INTRAVENOUS at 15:04

## 2023-12-30 RX ADMIN — Medication 100 MG: at 15:04

## 2023-12-30 RX ADMIN — FENTANYL CITRATE 25 MCG: 50 INJECTION, SOLUTION INTRAMUSCULAR; INTRAVENOUS at 14:12

## 2023-12-30 RX ADMIN — HYDROMORPHONE HYDROCHLORIDE 0.2 MG: 1 INJECTION, SOLUTION INTRAMUSCULAR; INTRAVENOUS; SUBCUTANEOUS at 13:26

## 2023-12-30 RX ADMIN — OXYCODONE HYDROCHLORIDE 5 MG: 5 TABLET ORAL at 16:20

## 2023-12-30 ASSESSMENT — ACTIVITIES OF DAILY LIVING (ADL)
ADLS_ACUITY_SCORE: 35
ADLS_ACUITY_SCORE: 33
ADLS_ACUITY_SCORE: 35

## 2023-12-30 ASSESSMENT — ENCOUNTER SYMPTOMS
SORE THROAT: 0
CHILLS: 0
ABDOMINAL PAIN: 0
EYES NEGATIVE: 1
RECTAL PAIN: 1
SHORTNESS OF BREATH: 0
FEVER: 0

## 2023-12-30 NOTE — H&P
"Colon and Rectal Surgery Consultation         Celia Telles    MRN# [id number]   YOB: 1985 Age: 38 year old   Date of Admission: 12/30/2023  Date of Consult: 12/30/2023          Assessment and Plan:      This is a 38-year-old male with a past medical history of diabetes presenting with several days of anal pain with CT scan showing a 1.8 cm abscess in the left perianal area.  Imaging were reviewed and this is likely an intersphincteric abscess which will not be amenable to external drainage.  Will plan to proceed to the operating room for exam under anesthesia and incision and drainage of intersphincteric abscess.  We discussed the risk and benefits of the procedure including risk of bleeding, infection, damage surrounding structures, incontinence.  Patient agrees and would like to proceed with procedure.    - Keep NPO  - Antibiotics given diabetes  - I+D in OR     Discussed with Dr. Thad Sanchez MD on 12/30/2023 at 1:20 PM          Primary Care Physician:      No Ref-Primary, Physician None         Requesting Physician:      MD Chrissy          Chief Complaint:      Perianal pain   History is obtained from the patient.         History of Present Illness:      Celia Telles is a 38 year old male who presents with perianal pain ongoing for a number days.  Patient reports he had a \"golf ball sized\" swollen area which his wife punctured.  He states it drained a lot of blood and pus and it felt improved four a couple of days only to have pain worsening over the last few days once again. Pain became unbearable and that prompted ED visit. Pain worse with sitting. He has never had anything like this before. Reports small amount of blood per rectum. No Constipation. Never had colonoscopy.                  Past Medical History:        Past Medical History:   Diagnosis Date    Diabetes (H)              Past Surgical History:      No past surgical history on file.              Home Medications:        Prior to " "Admission medications    Medication Sig Last Dose Taking? Auth Provider Long Term End Date   amoxicillin-clavulanate (AUGMENTIN) 875-125 MG tablet Take 1 tablet by mouth 2 times daily for 7 days   Dar Ferreira MD  24   HYDROcodone-acetaminophen (NORCO) 5-325 MG tablet Take 1 tablet by mouth every 6 hours as needed   Dar Ferreira MD  24   ibuprofen (ADVIL/MOTRIN) 600 MG tablet Take 1 tablet (600 mg) by mouth every 8 hours as needed   Dar Ferreira MD     senna-docusate (SENOKOT-S/PERICOLACE) 8.6-50 MG tablet Take 1 tablet by mouth 2 times daily for 15 doses   Dar Ferreira MD  24            Current Medications:          HYDROmorphone  0.2 mg Intravenous Once    ondansetron  4 mg Intravenous Once             Allergies:   No Known Allergies         Social History:        Social History     Tobacco Use    Smoking status: Not on file    Smokeless tobacco: Not on file   Substance Use Topics    Alcohol use: Not on file             Family History:      No family history on file.  No fhx of IBD         Review of Systems:        The 10 point Review of Systems is negative other than noted in the HPI.            Physical Exam:      Blood pressure 134/85, pulse 97, temperature 99  F (37.2  C), temperature source Temporal, resp. rate 16, height 1.676 m (5' 6\"), weight 71.2 kg (157 lb), SpO2 97%.  Vitals:    23 0843   Weight: 71.2 kg (157 lb)     Vital Sign Ranges  Temperature Temp  Av.7  F (37.1  C)  Min: 98.3  F (36.8  C)  Max: 99  F (37.2  C)   Blood pressure Systolic (24hrs), Av , Min:128 , Max:134        Diastolic (24hrs), Av, Min:77, Max:94      Pulse Pulse  Av  Min: 95  Max: 97   Respirations Resp  Av.7  Min: 16  Max: 20   Pulse oximetry SpO2  Av %  Min: 95 %  Max: 97 %       No intake or output data in the 24 hours ending 23 1317    General: No acute distress  Cardiovascular: Regular rate and rhythm  Lungs: Breathing unlabored.  Clear to auscultation " bilaterally.  Abdomen: Soft, nontender, nondistended  Rectal exam: There is an small area of induration but no obvious fluctuance on the left side of the anal area.  No erythema or discharge.         Data:      All new lab and imaging data was reviewed.   Recent Labs   Lab Test 12/30/23  1020 12/29/23 0326 04/20/20 1915   WBC 8.6 8.1 7.7   HGB 16.9 16.4 17.7    163 172   INR  --   --  0.95      Recent Labs   Lab Test 12/30/23  1245 12/30/23  1020 12/29/23 0326 04/20/20  1920 04/20/20 1915   NA  --  136 137  --  141   POTASSIUM  --  4.1 3.7  --  3.7   CHLORIDE  --  99 100  --  104   CO2  --  27 26  --  25   BUN  --  16.3 13.6  --  18   CR  --  0.73 0.61* 0.8 0.92   ANIONGAP  --  10 11  --  12   LOREN  --  9.3 9.3  --  9.7   * 221* 264*  --  217*       Gerry Sanchez MD  [facility]

## 2023-12-30 NOTE — ANESTHESIA POSTPROCEDURE EVALUATION
Patient: Celia Telles    Procedure: Procedure(s):  INCISION AND DRAINAGE, ABSCESS, RECTUM       Anesthesia Type:  General    Note:  Disposition: Outpatient   Postop Pain Control: Uneventful            Sign Out: Well controlled pain   PONV: No   Neuro/Psych: Uneventful            Sign Out: Acceptable/Baseline neuro status   Airway/Respiratory: Uneventful            Sign Out: Acceptable/Baseline resp. status   CV/Hemodynamics: Uneventful            Sign Out: Acceptable CV status; No obvious hypovolemia; No obvious fluid overload   Other NRE: NONE   DID A NON-ROUTINE EVENT OCCUR? No           Last vitals:  Vitals Value Taken Time   /78 12/30/23 1600   Temp 36.9  C (98.4  F) 12/30/23 1546   Pulse 98 12/30/23 1614   Resp 20 12/30/23 1614   SpO2 97 % 12/30/23 1614   Vitals shown include unfiled device data.    Electronically Signed By: Roni Campbell MD  December 30, 2023  4:37 PM

## 2023-12-30 NOTE — ED TRIAGE NOTES
Pt presents with severe rectal/perineal pain. Pt was diagnosed with a clarence-rectal abscess and given medication yesterday. The area has increased redness and firmness around it.      Triage Assessment (Adult)       Row Name 12/30/23 0844          Triage Assessment    Airway WDL WDL        Respiratory WDL    Respiratory WDL WDL        Skin Circulation/Temperature WDL    Skin Circulation/Temperature WDL WDL        Cardiac WDL    Cardiac WDL WDL        Peripheral/Neurovascular WDL    Peripheral Neurovascular WDL WDL        Cognitive/Neuro/Behavioral WDL    Cognitive/Neuro/Behavioral WDL WDL

## 2023-12-30 NOTE — ANESTHESIA PROCEDURE NOTES
Airway       Patient location during procedure: OR       Procedure Start/Stop Times: 12/30/2023 3:08 PM  Staff -        CRNA: Armin Ferrara APRN CRNA       Performed By: CRNA  Consent for Airway        Urgency: elective  Indications and Patient Condition       Indications for airway management: clarence-procedural       Induction type:inhalational       Mask difficulty assessment: 0 - not attempted    Final Airway Details       Final airway type: endotracheal airway       Successful airway: ETT - single  Endotracheal Airway Details        ETT size (mm): 7.5       Cuffed: yes       Cuff volume (mL): 8       Successful intubation technique: direct laryngoscopy       DL Blade Type: MAC 4       Grade View of Cords: 1       Adjucts: stylet       Position: Right       Measured from: lips       Secured at (cm): 21       Bite block used: None    Post intubation assessment        Placement verified by: capnometry, equal breath sounds and chest rise        Number of attempts at approach: 1       Number of other approaches attempted: 0       Secured with: tape       Ease of procedure: easy       Dentition: Intact    Medication(s) Administered   Medication Administration Time: 12/30/2023 3:08 PM

## 2023-12-30 NOTE — OP NOTE
COLON AND RECTAL SURGERY OPERATIVE NOTE    DATE OF SERVICE: 12/30/2023      PROCEDURE NAME: Rectal exam under anesthesia with incision and drainage of perirectal abscess     PRE-PROCEDURE DIAGNOSIS: perirectal abscess     POST-PROCEDURE DIAGNOSIS: same     SURGEON: Sara Oneil MD     ASSISTANT: Gerry Sanchez MD, [facility] fellow     FINDINGS: left perianal abscess and left posterior perirectal abscess cavity, not connected     ESTIMATED BLOOD LOSS: 2mL     ANESTHESIA: general     SPECIMEN: None.     INDICATIONS FOR PROCEDURE:  Celia Telles is a 38 year old male presenting with increased pain/swelling from a perirectal abscess. He actually had a much larger one last week that he drained at home and then developed worsening pain and swelling in a slightly different area as well. He was started on antibiotics but that did not help and the pain increased as well as swelling. CT confirmed presence of abscess and this was not able to be done at the bedside due to pain and small size. Examination in the operating room was recommended.  The risks and benefits of surgery were discussed with the patient including infection, abscess recurrence, need for further operative interventions, possible damage to the sphincter and incontinence and increased pain and bleeding were discussed with the  patient. Alternatives were also discussed. The patient agreed to proceed with surgery and informed consent was obtained.         DESCRIPTION OF PROCEDURE:  The patient was taken to the operating room and placed under general endotracheal anesthesia. He was then placed in the prone philippe knife position on the operating room table. The buttocks were taped apart. The surgical area was then prepped and draped in the usual sterile fashion. A timeout was performed per protocol.  A digital rectal exam was performed which revealed no abnormalities within the anal canal.  The bivalve anoscope was inserted and no internal opening or pus was seen.  The distal rectal mucosa appeared normal. I palpated around the perianal area. In the left posterior perianal area there was an indurated cavity that was consistent with the previous abscess he described. In the left anterolateral perianal space there was an area of subtle fluctuance. An 18 gauge sounding needle was introduced here and pus was withdrawn. I then made a cruciate incision at this location and drained out more pus. I used a Sophia clamp to break up any loculations. It did not seem to communicate with the other pocket. To ensure the other pocket was well drained, we also made a cruciate incision over the left posterior abscess cavity and probed it as well. There was no pus within this cavity but it was thickened and indurated.     Hemostasis was achieved. I injected 30ml of 0.25% marcaine in the area and as a pudenal block.  All sponge, needle and instrument counts  were correct at the end of the procedure. The patient tolerated the procedure well  and was awakened from anesthesia without difficulty, and transferred to the recovery room in stable condition.    Sara Oneil MD, MS, FACS, FASCRS  [facility]  Office: [phone]  12/30/2023 3:49 PM

## 2023-12-30 NOTE — ANESTHESIA PREPROCEDURE EVALUATION
Anesthesia Pre-Procedure Evaluation    Patient: Celia Telles   MRN: [id number] : 1985        Procedure : Procedure(s):  INCISION AND DRAINAGE, ABSCESS, RECTUM          Past Medical History:   Diagnosis Date    Diabetes (H)       No past surgical history on file.   No Known Allergies   Social History     Tobacco Use    Smoking status: Not on file    Smokeless tobacco: Not on file   Substance Use Topics    Alcohol use: Not on file      Wt Readings from Last 1 Encounters:   23 71.2 kg (157 lb)        Anesthesia Evaluation            ROS/MED HX  ENT/Pulmonary:  - neg pulmonary ROS     Neurologic:  - neg neurologic ROS     Cardiovascular:  - neg cardiovascular ROS     METS/Exercise Tolerance: >4 METS    Hematologic:  - neg hematologic  ROS     Musculoskeletal:  - neg musculoskeletal ROS     GI/Hepatic:  - neg GI/hepatic ROS     Renal/Genitourinary:  - neg Renal ROS     Endo:     (+) type I DM,    Not using insulin, - not using insulin pump.                Psychiatric/Substance Use:  - neg psychiatric ROS     Infectious Disease:  - neg infectious disease ROS     Malignancy:  - neg malignancy ROS     Other:  - neg other ROS          Physical Exam    Airway  airway exam normal      Mallampati: I   TM distance: > 3 FB   Neck ROM: full   Mouth opening: > 3 cm    Respiratory Devices and Support         Dental       (+) Completely normal teeth      Cardiovascular   cardiovascular exam normal       Rhythm and rate: regular and normal     Pulmonary   pulmonary exam normal        breath sounds clear to auscultation           OUTSIDE LABS:  CBC:   Lab Results   Component Value Date    WBC 8.6 2023    WBC 8.1 2023    HGB 16.9 2023    HGB 16.4 2023    HCT 49.3 2023    HCT 46.9 2023     2023     2023     BMP:   Lab Results   Component Value Date     2023     2023    POTASSIUM 4.1 2023    POTASSIUM 3.7 2023    CHLORIDE 99  "12/30/2023    CHLORIDE 100 12/29/2023    CO2 27 12/30/2023    CO2 26 12/29/2023    BUN 16.3 12/30/2023    BUN 13.6 12/29/2023    CR 0.73 12/30/2023    CR 0.61 (L) 12/29/2023     (H) 12/30/2023     (H) 12/30/2023     COAGS:   Lab Results   Component Value Date    PTT 33 04/20/2020    INR 0.95 04/20/2020     POC: No results found for: \"BGM\", \"HCG\", \"HCGS\"  HEPATIC: No results found for: \"ALBUMIN\", \"PROTTOTAL\", \"ALT\", \"AST\", \"GGT\", \"ALKPHOS\", \"BILITOTAL\", \"BILIDIRECT\", \"KARIE\"  OTHER:   Lab Results   Component Value Date    LACT 1.0 12/30/2023    LOREN 9.3 12/30/2023       Anesthesia Plan    ASA Status:  2    NPO Status:  NPO Appropriate    Anesthesia Type: General.     - Airway: ETT   Induction: RSI, Intravenous, Propofol.   Maintenance: Inhalation.        Consents    Anesthesia Plan(s) and associated risks, benefits, and realistic alternatives discussed. Questions answered and patient/representative(s) expressed understanding.     - Discussed:     - Discussed with:  Patient      - Extended Intubation/Ventilatory Support Discussed: No.      - Patient is DNR/DNI Status: No     Use of blood products discussed: No .     Postoperative Care    Pain management: Oral pain medications.   PONV prophylaxis: Ondansetron (or other 5HT-3), Dexamethasone or Solumedrol     Comments:               Roni Campbell MD    I have reviewed the pertinent notes and labs in the chart from the past 30 days and (re)examined the patient.  Any updates or changes from those notes are reflected in this note.              # Overweight: Estimated body mass index is 25.34 kg/m  as calculated from the following:    Height as of this encounter: 1.676 m (5' 6\").    Weight as of this encounter: 71.2 kg (157 lb).      "

## 2023-12-30 NOTE — ED PROVIDER NOTES
EMERGENCY DEPARTMENT ENCOUNTER      NAME: Celia Telles  AGE: 38 year old male  YOB: 1985  MRN: [id number]  EVALUATION DATE & TIME: 12/30/2023  8:48 AM    PCP: No Ref-Primary, Physician    ED PROVIDER: Ginny Lowe M.D.      CHIEF COMPLAINT     Chief Complaint   Patient presents with    Rectal/perineal Pain         FINAL IMPRESSION:     1. Perianal abscess    2. Rectal abscess          MEDICAL DECISION MAKING:       Pertinent Labs & Imaging studies reviewed. (See chart for details)    38 year old male presents to the Emergency Department for evaluation of rectal pain.    History  Supplemental history from wife  External Record(s) review as documented below    Exam  Nontoxic cooperative and pleasant benign abdominal exam  examination with a chaperone reveals a indurated area on the left side without fluctuance.  Digital rectal examination reveals no gross masses.    Differential Diagnosis include but not limited to anal a no fistula abscess malignancy rectal Jennifer's gangrene among others      Vital Signs: Reviewed  EKG: None  Imaging: I reviewed CT abdomen and pelvis from yesterday.  Concern for fistula.  Home meds:  ED meds: Rocephin Flagyl lidocaine gel  Fluids:ns  Labs:  K 4.1  Cr 0.73  Wbc 8.6  Hgb 16.9  platelets 176  Lactic 1      Clinical Impression and Decision Making    38-year-old male history of diabetes presents with his wife.  He was seen here yesterday diagnosed with a perianal abscess charged with antibiotics.  He feels like the area is getting worse and he is having more pain.    He initially noticed a few weeks ago there was some spontaneous drainage but they feel that now he has been more swollen compared to yesterday.    He is well-appearing on examination.   exam with chaperone reveals no gross fluctuant there is an indurated area on the right side of the anal area but not abscess for me to drain.    Reviewed the CT.  They recommended an MRI concern about fistula.  Spoke with  colorectal surgery who was able to review CT.  States there is no role for MRI.  Feels that patient needs to go to the OR for further evaluation.    Patient has been n.p.o. for solid food since yesterday.  Given Rocephin and Flagyl.  Given lidocaine gel.  Transferred to the OR in stable condition for further evaluation.      IMPRESSION:  1.  1.8 cm perianal abscess just to the left of midline near the anal opening. Cannot exclude underlying fistulous communication to the internal canal. MRI could better evaluate if clinically warranted. There is some nonspecific subcutaneous thickening   and induration extending posteriorly along the left gluteal fold. No other fluid collections seen.     2.  Remainder of the exam is unremarkable.       In addition to the work out documented, I considered the following work up repeating CT.  After discussion with colorectal no need for repeating CT.           Medical Decision Making    History:  Supplemental history from: Documented in chart, if applicable  External Record(s) reviewed: Documented in chart, if applicable.    Work Up:  Chart documentation includes differential considered and any EKGs or imaging independently interpreted by provider, where specified.  In additional to work up documented, I considered the following work up: Documented in chart, if applicable.    External consultation:  Discussion of management with another provider: Documented in chart, if applicable    Complicating factors:  Care impacted by chronic illness: Diabetes and Other: LTBI  Care affected by social determinants of health: N/A    Disposition considerations:  Going to OR          Review of External Records  Hospital/Clinic: [facility]  Date: 12/18/2023    Diabetes    External Consultation  Colorectal Surgery, Dr. Sanchez      ED COURSE   9:12 AM Met with the patient and performed my initial exam.  9:30 AM Paging Colorectal.  9:49 AM Spoke with Dr. Sanchez, Colorectal Surgery. He will call  "back.  10:18 AM Spoke with Dr. Sanchez, Colorectal Surgery. Patient will go to OR sometime today.  10:22 AM Rechecked and updated patient.  10:42 AM Rechecked and updated patient.    At the conclusion of the encounter I discussed the results of all of the tests and the disposition. The questions were answered. The patient and wife acknowledged understanding and was agreeable with the care plan.         MEDICATIONS GIVEN IN THE EMERGENCY:     Medications   ondansetron (ZOFRAN) injection 4 mg (has no administration in time range)   HYDROmorphone (PF) (DILAUDID) injection 0.2 mg (has no administration in time range)   lidocaine (XYLOCAINE) 2 % external gel 10 mL (10 mLs Topical $Given 12/30/23 1001)   cefTRIAXone (ROCEPHIN) 1 g vial to attach to  mL bag for ADULTS or NS 50 mL bag for PEDS (0 g Intravenous Stopped 12/30/23 1128)   metroNIDAZOLE (FLAGYL) infusion 500 mg (500 mg Intravenous $New Bag 12/30/23 1128)   sodium chloride 0.9 % infusion ( Intravenous $New Bag 12/30/23 1047)       NEW PRESCRIPTIONS STARTED AT TODAY'S ER VISIT     Current Discharge Medication List             =================================================================    HPI     Patient information was obtained from: Patient and patient's wife    Use of : N/A        Celia Telles is a 38 year old male who presents by walk-in for evaluation of rectal and perineal pain.    Per chart review: Patient was here yesterday, had a CT.    Patient reports he was seen at ED yesterday for severe rectal and perineal pain and was diagnosed with a clarence-rectal abscess and given antibiotics yesterday. He states that he still has rectal pain that worsens with movement and painful when voiding. [place] that last week on Saturday (12/23), he though he had Influenza because he was vomiting and had diarrhea and after a few days of vomiting and diarrhea, he reports worsening of rectal and perineal pain. On Saturday, he wife noticed a \"huge size\" abscess " "and popped it and mentions white pus came out and notes \"white\" around the area externally. After a couple of days, notes abscess has increasingly swollen and larger in size. Denies fever, chills, issues with vision, sore throat, chest pain, shortness of breath, abdominal pain, testicular pain, and penile pain. No other reported complaints or concerns at this time.      REVIEW OF SYSTEMS   Review of Systems   Constitutional:  Negative for chills and fever.   HENT:  Negative for sore throat.    Eyes: Negative.    Respiratory:  Negative for shortness of breath.    Cardiovascular:  Negative for chest pain.   Gastrointestinal:  Positive for rectal pain. Negative for abdominal pain.        Positive for clarence-rectal abscess with white drainage   Genitourinary:  Negative for penile pain and testicular pain.   All other systems reviewed and are negative.       PAST MEDICAL HISTORY:     Past Medical History:   Diagnosis Date    Diabetes (H)        PAST SURGICAL HISTORY:   No past surgical history on file.      CURRENT MEDICATIONS:   No current outpatient medications on file.       ALLERGIES:   No Known Allergies    FAMILY HISTORY:   No family history on file.    SOCIAL HISTORY:     Social History     Socioeconomic History    Marital status:        VITALS:   /85   Pulse 97   Temp 99  F (37.2  C) (Temporal)   Resp 16   Ht 1.676 m (5' 6\")   Wt 71.2 kg (157 lb)   SpO2 97%   BMI 25.34 kg/m      PHYSICAL EXAM     Physical Exam  Vitals and nursing note reviewed. Exam conducted with a chaperone present.   Constitutional:       General: He is not in acute distress.     Appearance: Normal appearance. He is normal weight. He is not ill-appearing or toxic-appearing.   Genitourinary:     Penis: Normal.       Testes: Normal.      Prostate: Normal.          Comments: Location of pain at around 9/10/11 o'clock region  No fluctuance  No erythema    Neurological:      Mental Status: He is alert.         Physical Exam "   Constitutional: Appearing cooperative and pleasant.    Head: Atraumatic.     Nose: Nose normal.     Mouth/Throat: Oropharynx is clear and moist.     Eyes: EOM are normal. Pupils are equal, round, and reactive to light.     Ears: Bilateral pearly white tympanic membranes.    Neck: Normal range of motion. Neck supple.     Cardiovascular: Normal rate, regular rhythm and normal heart sounds.      Pulmonary/Chest: Normal effort  and breath sounds normal.     Abdominal: soft nontender.    Musculoskeletal: Normal range of motion.     Neurological: Moves upper and lower extremities equally.    Lymphatics: no edema    : Chaperone tender area around the 8 9 and 10 o'clock position.  No fluctuance.    Skin: Skin is warm and dry.     Psychiatric: Normal mood and affect. Behavior is normal.       LAB:     All pertinent labs reviewed and interpreted.  Labs Ordered and Resulted from Time of ED Arrival to Time of ED Departure   BASIC METABOLIC PANEL - Abnormal       Result Value    Sodium 136      Potassium 4.1      Chloride 99      Carbon Dioxide (CO2) 27      Anion Gap 10      Urea Nitrogen 16.3      Creatinine 0.73      GFR Estimate >90      Calcium 9.3      Glucose 221 (*)    CRP INFLAMMATION - Abnormal    CRP Inflammation 23.70 (*)    CBC WITH PLATELETS AND DIFFERENTIAL - Abnormal    WBC Count 8.6      RBC Count 6.02 (*)     Hemoglobin 16.9      Hematocrit 49.3      MCV 82      MCH 28.1      MCHC 34.3      RDW 11.9      Platelet Count 176      % Neutrophils 72      % Lymphocytes 18      % Monocytes 7      % Eosinophils 2      % Basophils 0      % Immature Granulocytes 1      NRBCs per 100 WBC 0      Absolute Neutrophils 6.2      Absolute Lymphocytes 1.6      Absolute Monocytes 0.6      Absolute Eosinophils 0.1      Absolute Basophils 0.0      Absolute Immature Granulocytes 0.1      Absolute NRBCs 0.0     LACTIC ACID WHOLE BLOOD - Normal    Lactic Acid 1.0          RADIOLOGY:     Reviewed all pertinent imaging. Please see  official radiology report.  No orders to display        EKG:       I have independently reviewed and interpreted the EKG(s) documented above.      PROCEDURES:     Procedures      I, Gabbie Robles, am serving as a scribe to document services personally performed by Dr. Lowe based on my observation and the provider's statements to me. I, Ginny Lowe MD attest that Gbabie Robles is acting in a scribe capacity, has observed my performance of the services and has documented them in accordance with my direction.    Ginny Lowe M.D.  Emergency Medicine  [facility] EMERGENCY DEPARTMENT  [address] [place] [id number]  [phone]  Dept: [phone]       Ginny Lowe MD  12/30/23 7928

## 2023-12-30 NOTE — ED NOTES
Patient ambulatory to [facility], states he was seen here yesterday for concern of abscess at sacral area. Abscess has enlarged and  painful, not draining. Last stool 2 days ago

## 2023-12-30 NOTE — ANESTHESIA CARE TRANSFER NOTE
Patient: Celia Telles    Procedure: Procedure(s):  INCISION AND DRAINAGE, ABSCESS, RECTUM       Diagnosis: Perianal abscess [K61.0]  Diagnosis Additional Information: No value filed.    Anesthesia Type:   General     Note:    Oropharynx: oropharynx clear of all foreign objects  Level of Consciousness: awake  Oxygen Supplementation: nasal cannula  Level of Supplemental Oxygen (L/min / FiO2): 3  Independent Airway: airway patency satisfactory and stable  Dentition: dentition unchanged  Vital Signs Stable: post-procedure vital signs reviewed and stable  Report to RN Given: handoff report given  Patient transferred to: PACU  Comments: Patient spontaneously breathing.  Tidal volumes > 400ml.  Following commands, suctioned and extubated with [place].  O2 via NC at 3L.  To PACU, VSS, SBAR report to RN per institutional handoff policy and procedure.  Transfer of care.  Handoff Report: Identifed the Patient, Identified the Reponsible Provider, Reviewed the pertinent medical history, Discussed the surgical course, Reviewed Intra-OP anesthesia mangement and issues during anesthesia, Set expectations for post-procedure period and Allowed opportunity for questions and acknowledgement of understanding      Vitals:  Vitals Value Taken Time   /83 12/30/23 1546   Temp     Pulse 98 12/30/23 1546   Resp 20 12/30/23 1546   SpO2 99 % 12/30/23 1545   Vitals shown include unfiled device data.    Electronically Signed By: AMY ORNELAS CRNA  December 30, 2023  3:47 PM

## 2023-12-30 NOTE — BRIEF OP NOTE
[facility]    Brief Operative Note    Pre-operative diagnosis: Perianal abscess [K61.0]  Post-operative diagnosis Same as pre-operative diagnosis    Procedure: INCISION AND DRAINAGE, ABSCESS, RECTUM, N/A - Rectum    Surgeon: Surgeon(s) and Role:     * Sara Oneil MD - Primary     * Gerry Sanchez MD - Assisting  Anesthesia: General   Estimated Blood Loss: 1 mL    Drains: None  Specimens: * No specimens in log *  Findings:   Left perianal abscess cavity with minimal pus  .EUA without any findings in the anal canal.   Complications: None.  Implants: * No implants in log *

## 2024-03-09 ENCOUNTER — HEALTH MAINTENANCE LETTER (OUTPATIENT)
Age: 39
End: 2024-03-09

## 2025-03-16 ENCOUNTER — HEALTH MAINTENANCE LETTER (OUTPATIENT)
Age: 40
End: 2025-03-16

## 2025-04-30 SDOH — HEALTH STABILITY: PHYSICAL HEALTH: ON AVERAGE, HOW MANY MINUTES DO YOU ENGAGE IN EXERCISE AT THIS LEVEL?: 20 MIN

## 2025-04-30 SDOH — HEALTH STABILITY: PHYSICAL HEALTH: ON AVERAGE, HOW MANY DAYS PER WEEK DO YOU ENGAGE IN MODERATE TO STRENUOUS EXERCISE (LIKE A BRISK WALK)?: 1 DAY

## 2025-04-30 ASSESSMENT — SOCIAL DETERMINANTS OF HEALTH (SDOH): HOW OFTEN DO YOU GET TOGETHER WITH FRIENDS OR RELATIVES?: THREE TIMES A WEEK

## 2025-05-01 ENCOUNTER — OFFICE VISIT (OUTPATIENT)
Dept: FAMILY MEDICINE | Facility: CLINIC | Age: 40
End: 2025-05-01
Payer: COMMERCIAL

## 2025-05-01 VITALS
SYSTOLIC BLOOD PRESSURE: 122 MMHG | TEMPERATURE: 97.3 F | WEIGHT: 169 LBS | BODY MASS INDEX: 27.16 KG/M2 | DIASTOLIC BLOOD PRESSURE: 81 MMHG | RESPIRATION RATE: 18 BRPM | HEART RATE: 87 BPM | OXYGEN SATURATION: 95 % | HEIGHT: 66 IN

## 2025-05-01 DIAGNOSIS — H00.15 CHALAZION LEFT LOWER EYELID: ICD-10-CM

## 2025-05-01 DIAGNOSIS — K13.21 LEUKOPLAKIA OF ORAL MUCOSA: ICD-10-CM

## 2025-05-01 DIAGNOSIS — E11.9 TYPE 2 DIABETES MELLITUS WITHOUT COMPLICATION, WITHOUT LONG-TERM CURRENT USE OF INSULIN (H): ICD-10-CM

## 2025-05-01 DIAGNOSIS — Z83.49 FAMILY HISTORY OF THYROID DISEASE: ICD-10-CM

## 2025-05-01 DIAGNOSIS — Z00.00 ROUTINE GENERAL MEDICAL EXAMINATION AT A HEALTH CARE FACILITY: Primary | ICD-10-CM

## 2025-05-01 PROBLEM — E78.9 ABNORMAL CHOLESTEROL TEST: Status: RESOLVED | Noted: 2021-09-27 | Resolved: 2025-05-01

## 2025-05-01 PROBLEM — E78.9 ABNORMAL CHOLESTEROL TEST: Status: ACTIVE | Noted: 2021-09-27

## 2025-05-01 LAB
ALBUMIN MFR UR ELPH: 14 MG/DL
ALBUMIN SERPL BCG-MCNC: 4.9 G/DL (ref 3.5–5.2)
ALP SERPL-CCNC: 65 U/L (ref 40–150)
ALT SERPL W P-5'-P-CCNC: 42 U/L (ref 0–70)
ANION GAP SERPL CALCULATED.3IONS-SCNC: 15 MMOL/L (ref 7–15)
APO A-I SERPL-MCNC: <6 MG/DL
AST SERPL W P-5'-P-CCNC: 25 U/L (ref 0–45)
BILIRUB SERPL-MCNC: 1 MG/DL
BUN SERPL-MCNC: 17.5 MG/DL (ref 6–20)
CALCIUM SERPL-MCNC: 9.5 MG/DL (ref 8.8–10.4)
CHLORIDE SERPL-SCNC: 96 MMOL/L (ref 98–107)
CHOLEST SERPL-MCNC: 293 MG/DL
CREAT SERPL-MCNC: 0.7 MG/DL (ref 0.67–1.17)
CREAT UR-MCNC: 58.8 MG/DL
EGFRCR SERPLBLD CKD-EPI 2021: >90 ML/MIN/1.73M2
ERYTHROCYTE [DISTWIDTH] IN BLOOD BY AUTOMATED COUNT: 11.9 % (ref 10–15)
EST. AVERAGE GLUCOSE BLD GHB EST-MCNC: 272 MG/DL
FASTING STATUS PATIENT QL REPORTED: YES
FASTING STATUS PATIENT QL REPORTED: YES
GLUCOSE SERPL-MCNC: 336 MG/DL (ref 70–99)
HBA1C MFR BLD: 11.1 % (ref 0–5.6)
HCO3 SERPL-SCNC: 24 MMOL/L (ref 22–29)
HCT VFR BLD AUTO: 51.5 % (ref 40–53)
HDLC SERPL-MCNC: 31 MG/DL
HGB BLD-MCNC: 17.8 G/DL (ref 13.3–17.7)
HIV 1+2 AB+HIV1 P24 AG SERPL QL IA: NONREACTIVE
INSULIN SERPL-ACNC: 5.8 UU/ML (ref 2.6–24.9)
LDLC SERPL CALC-MCNC: ABNORMAL MG/DL
MCH RBC QN AUTO: 27.9 PG (ref 26.5–33)
MCHC RBC AUTO-ENTMCNC: 34.6 G/DL (ref 31.5–36.5)
MCV RBC AUTO: 81 FL (ref 78–100)
NONHDLC SERPL-MCNC: 262 MG/DL
PLATELET # BLD AUTO: 173 10E3/UL (ref 150–450)
POTASSIUM SERPL-SCNC: 4.2 MMOL/L (ref 3.4–5.3)
PROT SERPL-MCNC: 7.6 G/DL (ref 6.4–8.3)
PROT/CREAT 24H UR: 0.24 MG/MG CR (ref 0–0.2)
RBC # BLD AUTO: 6.39 10E6/UL (ref 4.4–5.9)
SODIUM SERPL-SCNC: 135 MMOL/L (ref 135–145)
TRIGL SERPL-MCNC: 1020 MG/DL
TSH SERPL DL<=0.005 MIU/L-ACNC: 1.03 UIU/ML (ref 0.3–4.2)
WBC # BLD AUTO: 5.3 10E3/UL (ref 4–11)

## 2025-05-01 ASSESSMENT — LIFESTYLE VARIABLES
HOW OFTEN DO YOU HAVE A DRINK CONTAINING ALCOHOL: 2-4 TIMES A MONTH
SKIP TO QUESTIONS 9-10: 0
HOW OFTEN DO YOU HAVE SIX OR MORE DRINKS ON ONE OCCASION: WEEKLY
AUDIT-C TOTAL SCORE: 8
HOW MANY STANDARD DRINKS CONTAINING ALCOHOL DO YOU HAVE ON A TYPICAL DAY: 7 TO 9

## 2025-05-01 NOTE — PROGRESS NOTES
Prior to immunization administration, verified patients identity using patient s name and date of birth. Please see Immunization Activity for additional information.     Screening Questionnaire for Adult Immunization    Are you sick today?   No   Do you have allergies to medications, food, a vaccine component or latex?   No   Have you ever had a serious reaction after receiving a vaccination?   No   Do you have a long-term health problem with heart, lung, kidney, or metabolic disease (e.g., diabetes), asthma, a blood disorder, no spleen, complement component deficiency, a cochlear implant, or a spinal fluid leak?  Are you on long-term aspirin therapy?   Don't Know   Do you have cancer, leukemia, HIV/AIDS, or any other immune system problem?   No   Do you have a parent, brother, or sister with an immune system problem?   No   In the past 3 months, have you taken medications that affect  your immune system, such as prednisone, other steroids, or anticancer drugs; drugs for the treatment of rheumatoid arthritis, Crohn s disease, or psoriasis; or have you had radiation treatments?   No   Have you had a seizure, or a brain or other nervous system problem?   No   During the past year, have you received a transfusion of blood or blood    products, or been given immune (gamma) globulin or antiviral drug?   No   For women: Are you pregnant or is there a chance you could become       pregnant during the next month?   No   Have you received any vaccinations in the past 4 weeks?   No     Immunization questionnaire answers were all negative.      Patient instructed to remain in clinic for 15 minutes afterwards, and to report any adverse reactions.     Screening performed by Asa Mcgee MA on 5/1/2025 at 8:07 AM.

## 2025-05-01 NOTE — ASSESSMENT & PLAN NOTE
New patient to me here for routine physical.  Past records reviewed.  Personal medical, surgical, social, and family histories reviewed and updated.    Preventative Plan:  Colon: plan at 45  Immunizations: TDaP today  Labs: see orders    Healthy lifestyle and healthy aging recommendations reinforced including the importance of:  regular exercise & weight lifting  adequate and regular sleep, at least 7 hrs nightly   5+ fruits and veggies daily, whole grains, limit processed food  social connections

## 2025-05-01 NOTE — ASSESSMENT & PLAN NOTE
White plaque present on the bilateral inner cheeks for almost a year now.  Originally was able to scrape it off, now cannot.  Does not have any white coating on his tongue.  Photos present under exam.  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  I recommended ENT referral for biopsy and further treatment.  Also gave list of dental clinics.

## 2025-05-01 NOTE — ASSESSMENT & PLAN NOTE
Present for several months, it got bigger and his wife who is an RN popped it.  Now it is back.  Recommended warm compresses and seeing an eye doctor, referral placed.

## 2025-05-01 NOTE — ASSESSMENT & PLAN NOTE
Diagnosed 3 to 4 years ago with A1c of 11.  He states he got it back down to 8 with exercise and diet changes.  He did not want to take medicine for it.  He has not been exercising as much lately.  He also had an insurance change and so needed to establish a new clinic.  His dad also has diabetes.  Today, A1c of 11.1.  I sent him a message recommending metformin and a statin as his lipids are markedly elevated as well with triglycerides greater than 1000.  Mild proteinuria.

## 2025-05-01 NOTE — PROGRESS NOTES
Preventive Care Visit  [facility]  Lindaphillip Hidalgo MD, Family Medicine  May 1, 2025      Assessment & Plan   Problem List Items Addressed This Visit       Type 2 diabetes mellitus without complication, without long-term current use of insulin (H)     Diagnosed 3 to 4 years ago with A1c of 11.  He states he got it back down to 8 with exercise and diet changes.  He did not want to take medicine for it.  He has not been exercising as much lately.  He also had an insurance change and so needed to establish a new clinic.  His dad also has diabetes.  Today, A1c of 11.1.  I sent him a message recommending metformin and a statin as his lipids are markedly elevated as well with triglycerides greater than 1000.  Mild proteinuria.         Relevant Orders    Hemoglobin A1c (Completed)    Lipid Profile (Chol, Trig, HDL, LDL calc) (Completed)    Lipoprotein (a) (Completed)    Protein  random urine (Completed)    Comprehensive metabolic panel (BMP + Alb, Alk Phos, ALT, AST, Total. Bili, TP) (Completed)    Adult Eye  Referral    Insulin level    Routine general medical examination at a health care facility - Primary     New patient to me here for routine physical.  Past records reviewed.  Personal medical, surgical, social, and family histories reviewed and updated.    Preventative Plan:  Colon: plan at 45  Immunizations: TDaP today  Labs: see orders    Healthy lifestyle and healthy aging recommendations reinforced including the importance of:  regular exercise & weight lifting  adequate and regular sleep, at least 7 hrs nightly   5+ fruits and veggies daily, whole grains, limit processed food  social connections           Family history of thyroid disease     Has several siblings with thyroid disease.  TSH checked today and was normal.         Relevant Orders    TSH with free T4 reflex (Completed)    Chalazion left lower eyelid     Present for several months, it got bigger and his wife who  "is an RN popped it.  Now it is back.  Recommended warm compresses and seeing an eye doctor, referral placed.         Relevant Orders    Adult Eye  Referral    Leukoplakia of oral mucosa     White plaque present on the bilateral inner cheeks for almost a year now.  Originally was able to scrape it off, now cannot.  Does not have any white coating on his tongue.  Photos present under exam.  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  I recommended ENT referral for biopsy and further treatment.  Also gave list of dental clinics.         Relevant Orders    Adult ENT  Referral    HIV Antigen Antibody Combo (Completed)    CBC with platelets (Completed)        Patient has been advised of split billing requirements and indicates understanding: Yes     BMI  Estimated body mass index is 27.28 kg/m  as calculated from the following:    Height as of this encounter: 1.676 m (5' 6\").    Weight as of this encounter: 76.7 kg (169 lb).   Weight management plan: Discussed healthy diet and exercise guidelines    Counseling  Appropriate preventive services were addressed with this patient via screening, questionnaire, or discussion as appropriate for fall prevention, nutrition, physical activity, Tobacco-use cessation, social engagement, weight loss and cognition.  Checklist reviewing preventive services available has been given to the patient.  Reviewed patient's diet, addressing concerns and/or questions.   He is at risk for lack of exercise and has been provided with information to increase physical activity for the benefit of his well-being.   The patient was instructed to see the dentist every 6 months.   He is at risk for psychosocial distress and has been provided with information to reduce risk.     The longitudinal plan of care for the diagnosis(es)/condition(s) as documented were addressed during this visit. Due to the added complexity in care, I will continue to support Celia in the subsequent management and " with ongoing continuity of care.    Linda Hidalgo MD  [facility]      Kerwin Yang is a 39 year old, presenting for the following:  Physical (Want to check alc)        5/1/2025     8:04 AM   Additional Questions   Roomed by hser   Accompanied by self      Reviewed note from 12/18/2023 at [facility]  Diabetes was diet controlled at that time, but A1c returned at 8.5  Diagnosed 4-5 years ago (A1c was 11 initially), has never been on medicines before, not exercising as much    Then, hospitalized 12/30/2023 for incision and drainage of perirectal abscess    Last diabetic eye exam was 4/8/2024, records reviewed    Had migraine for almost a month - now resolved - this is why he made the appointment  Mentioned it to dad - shaman -   Tied a white string to his wrist two weeks ago and it got better  Headache in the mornings for a month or so    Mouth - worried he has cancer  Noticed in July 2024 - comes and goes  Worried it is allergic reaction  Depends on what he eats  Sometimes it is completely white  No pain, no bleeding    Bump under left eyelid since last summer - white pimple  Wife popped it and it drained  Showed up again about a month ago      Advance Care Planning    Discussed advance care planning with patient; informed AVS has link to [facility].        4/30/2025   General Health   How would you rate your overall physical health? Good   Feel stress (tense, anxious, or unable to sleep) To some extent   (!) STRESS CONCERN        4/30/2025   Nutrition   Three or more servings of calcium each day? (!) I DON'T KNOW   Diet: Regular (no restrictions)   How many servings of fruit and vegetables per day? (!) 0-1   How many sweetened beverages each day? 0-1     Eating more carbs now - pasta, etc.         4/30/2025   Exercise   Days per week of moderate/strenous exercise 1 day   Average minutes spent exercising at this level 20 min   (!) EXERCISE  CONCERN    Not exercising as much lately. Used to run for exercise - started when diagnosed with diabetes in 2022.         4/30/2025   Social Factors   Frequency of gathering with friends or relatives Three times a week   Worry food won't last until get money to buy more No   Food not last or not have enough money for food? Yes   Do you have housing? (Housing is defined as stable permanent housing and does not include staying outside in a car, in a tent, in an abandoned building, in an overnight shelter, or couch-surfing.) Yes   Are you worried about losing your housing? No   Lack of transportation? No   Unable to get utilities (heat,electricity)? No   (!) FOOD SECURITY CONCERN PRESENT    Doesn't want help with food         4/30/2025   Dental   Dentist two times every year? (!) NO     Has been a long time    Today's PHQ-2 Score:       4/30/2025    10:20 PM   PHQ-2 ( 1999 [employer])   Q1: Little interest or pleasure in doing things 1   Q2: Feeling down, depressed or hopeless 1   PHQ-2 Score 2    Q1: Little interest or pleasure in doing things Several days   Q2: Feeling down, depressed or hopeless Several days   PHQ-2 Score 2       Patient-reported           4/30/2025   Substance Use   Alcohol more than 3/day or more than 7/wk Not Applicable   Do you use any other substances recreationally? No     Social History     Tobacco Use    Smoking status: Never    Smokeless tobacco: Never   Vaping Use    Vaping status: Never Used   Substance Use Topics    Alcohol use: Yes     Comment: on weekends/events - some binge drinking    Drug use: Never             4/30/2025   One time HIV Screening   Previous HIV test? No         4/30/2025   STI Screening   New sexual partner(s) since last STI/HIV test? No         4/30/2025   Contraception/Family Planning   Questions about contraception or family planning No        Reviewed and updated as needed this visit by Provider   Tobacco  Allergies  Meds  Problems  Med Hx  Surg Hx  Fam Hx     "           Objective    Exam  /81 (BP Location: Right arm, Patient Position: Sitting, Cuff Size: Adult Regular)   Pulse 87   Temp 97.3  F (36.3  C) (Temporal)   Resp 18   Ht 1.676 m (5' 6\")   Wt 76.7 kg (169 lb)   SpO2 95%   BMI 27.28 kg/m     Estimated body mass index is 27.28 kg/m  as calculated from the following:    Height as of this encounter: 1.676 m (5' 6\").    Weight as of this encounter: 76.7 kg (169 lb).    Physical Exam  GEN: Patient sitting comfortably in no acute distress.  HEEN: Head is atraumatic, normocephalic, eyes anicteric, mucous membranes moist.  CV: Regular rate and rhythm without obvious murmurs.  PULM: Clear to auscultation bilaterally without wheezing or rales.  ABD: Soft, nontender, bowel sounds present.  NEURO: Alert and oriented x3.  No focal motor abnormalities.  Face symmetric.  PSYCH: Appropriate affect.  SKIN: No rashes, bruising, or other lesions.                    Signed Electronically by: Linda Hidalgo MD    "

## 2025-05-01 NOTE — PATIENT INSTRUCTIONS
Patient Education   Preventive Care Advice   This is general advice given by our system to help you stay healthy. However, your care team may have specific advice just for you. Please talk to your care team about your preventive care needs.  Nutrition  Eat 5 or more servings of fruits and vegetables each day.  Try wheat bread, brown rice and whole grain pasta (instead of white bread, rice, and pasta).  Get enough calcium and vitamin D. Check the label on foods and aim for 100% of the RDA (recommended daily allowance).  Lifestyle  Exercise at least 150 minutes each week  (30 minutes a day, 5 days a week).  Do muscle strengthening activities 2 days a week. These help control your weight and prevent disease.  No smoking.  Wear sunscreen to prevent skin cancer.  Have a dental exam and cleaning every 6 months.  Yearly exams  See your health care team every year to talk about:  Any changes in your health.  Any medicines your care team has prescribed.  Preventive care, family planning, and ways to prevent chronic diseases.  Shots (vaccines)   HPV shots (up to age 26), if you've never had them before.  Hepatitis B shots (up to age 59), if you've never had them before.  COVID-19 shot: Get this shot when it's due.  Flu shot: Get a flu shot every year.  Tetanus shot: Get a tetanus shot every 10 years.  Pneumococcal, hepatitis A, and RSV shots: Ask your care team if you need these based on your risk.  Shingles shot (for age 50 and up)  General health tests  Diabetes screening:  Starting at age 35, Get screened for diabetes at least every 3 years.  If you are younger than age 35, ask your care team if you should be screened for diabetes.  Cholesterol test: At age 39, start having a cholesterol test every 5 years, or more often if advised.  Bone density scan (DEXA): At age 50, ask your care team if you should have this scan for osteoporosis (brittle bones).  Hepatitis C: Get tested at least once in your life.  STIs (sexually  transmitted infections)  Before age 24: Ask your care team if you should be screened for STIs.  After age 24: Get screened for STIs if you're at risk. You are at risk for STIs (including HIV) if:  You are sexually active with more than one person.  You don't use condoms every time.  You or a partner was diagnosed with a sexually transmitted infection.  If you are at risk for HIV, ask about PrEP medicine to prevent HIV.  Get tested for HIV at least once in your life, whether you are at risk for HIV or not.  Cancer screening tests  Cervical cancer screening: If you have a cervix, begin getting regular cervical cancer screening tests starting at age 21.  Breast cancer scan (mammogram): If you've ever had breasts, begin having regular mammograms starting at age 40. This is a scan to check for breast cancer.  Colon cancer screening: It is important to start screening for colon cancer at age 45.  Have a colonoscopy test every 10 years (or more often if you're at risk) Or, ask your provider about stool tests like a FIT test every year or Cologuard test every 3 years.  To learn more about your testing options, visit:   .  For help making a decision, visit:   https://bit.ly/jp44226.  Prostate cancer screening test: If you have a prostate, ask your care team if a prostate cancer screening test (PSA) at age 55 is right for you.  Lung cancer screening: If you are a current or former smoker ages 50 to 80, ask your care team if ongoing lung cancer screenings are right for you.  For informational purposes only. Not to replace the advice of your health care provider. Copyright   2023 [facility]. All rights reserved. Clinically reviewed by the [facility]. [employer] [id number] - REV 01/24.  Learning About Being Physically Active  What is physical activity?     Being physically active means doing any kind of activity that gets your body moving.  The types of physical activity that can help you get  "fit and stay healthy include:  Aerobic or \"cardio\" activities. These make your heart beat faster and make you breathe harder, such as brisk walking, riding a bike, or running. They strengthen your heart and lungs and build up your endurance.  Strength training activities. These make your muscles work against, or \"resist,\" something. Examples include lifting weights or doing push-ups. These activities help tone and strengthen your muscles and bones.  Stretches. These let you move your joints and muscles through their full range of motion. Stretching helps you be more flexible.  Reaching a balance between these three types of physical activity is important because each one contributes to your overall fitness.  What are the benefits of being active?  Being active is one of the best things you can do for your health. It helps you to:  Feel stronger and have more energy to do all the things you like to do.  Focus better at school or work.  Feel, think, and sleep better.  Reach and stay at a healthy weight.  Lose fat and build lean muscle.  Lower your risk for serious health problems, including diabetes, heart attack, high blood pressure, and some cancers.  Keep your heart, lungs, bones, muscles, and joints strong and healthy.  How can you make being active part of your life?  Start slowly. Make it your long-term goal to get at least 30 minutes of exercise on most days of the week. Walking is a good choice. You also may want to do other activities, such as running, swimming, cycling, or playing tennis or team sports.  Pick activities that you like--ones that make your heart beat faster, your muscles stronger, and your muscles and joints more flexible. If you find more than one thing you like doing, do them all. You don't have to do the same thing every day.  Get your heart pumping every day. Any activity that makes your heart beat faster and keeps it at that rate for a while counts.  Here are some great ways to get your " "heart beating faster:  Go for a brisk walk, run, or hike.  Go for a swim or bike ride.  Take an online exercise class or dance.  Play a game of touch football, basketball, or soccer.  Play tennis, pickleball, or racquetball.  Climb stairs.  Even some household chores can be aerobic. Just do them at a faster pace. Raking or mowing the lawn, sweeping the garage, and vacuuming and cleaning your home all can help get your heart rate up.  Strengthen your muscles during the week. You don't have to lift heavy weights or grow big, bulky muscles to get stronger. Doing a few simple activities that make your muscles work against, or \"resist,\" something can help you get stronger. Aim for at least twice a week.  For example, you can:  Do push-ups or sit-ups, which use your own body weight as resistance.  Lift weights or dumbbells or use stretch bands at home or in a gym or community center.  Stretch your muscles often. Stretching will help you as you become more active. It can help you stay flexible and loosen tight muscles. It can also help improve your balance and posture and can be a great way to relax.  Be sure to stretch the muscles you'll be using when you work out. It's best to warm your muscles slightly before you stretch them. Walk or do some other light aerobic activity for a few minutes. Then start stretching.  When you stretch your muscles:  Do it slowly. Stretching is not about going fast or making sudden movements.  Don't push or bounce during a stretch.  Hold each stretch for at least 15 to 30 seconds, if you can. You should feel a stretch in the muscle, but not pain.  Breathe out as you do the stretch. Then breathe in as you hold the stretch. Don't hold your breath.  If you're worried about how more activity might affect your health, have a checkup before you start. Follow any special advice your doctor gives you for getting a smart start.  Where can you learn more?  Go to " "https://www.Nolio.net/patiented  Enter W332 in the search box to learn more about \"Learning About Being Physically Active.\"  Current as of: July 31, 2024  Content Version: 14.4 2024-2025 [employer].   Care instructions adapted under license by your healthcare professional. If you have questions about a medical condition or this instruction, always ask your healthcare professional. [employer] disclaims any warranty or liability for your use of this information.    Eating Healthy Foods: Care Instructions  With every meal, you can make healthy food choices. Try to eat a variety of fruits, vegetables, whole grains, lean proteins, and low-fat dairy products. This can help you get the right balance of nutrients, including vitamins and minerals. Small changes add up over time. You can start by adding one healthy food to your meals each day.    Try to make half your plate fruits and vegetables, one-fourth whole grains, and one-fourth lean proteins. Try including dairy with your meals.   Eat more fruits and vegetables. Try to have them with most meals and snacks.   Foods for healthy eating        Fruits   These can be fresh, frozen, canned, or dried.  Try to choose whole fruit rather than fruit juice.  Eat a variety of colors.        Vegetables   These can be fresh, frozen, canned, or dried.  Beans, peas, and lentils count too.        Whole grains   Choose whole-grain breads, cereals, and noodles.  Try brown rice.        Lean proteins   These can include lean meat, poultry, fish, and eggs.  You can also have tofu, beans, peas, lentils, nuts, and seeds.        Dairy   Try milk, yogurt, and cheese.  Choose low-fat or fat-free when you can.  If you need to, use lactose-free milk or fortified plant-based milk products, such as soy milk.        Water   Drink water when you're thirsty.  Limit sugar-sweetened drinks, including soda, fruit drinks, and sports drinks.  Where can you learn more?  Go to " "https://www.[employer].net/patiented  Enter T756 in the search box to learn more about \"Eating Healthy Foods: Care Instructions.\"  Current as of: October 7, 2024  Content Version: 14.4 2024-2025 [employer].   Care instructions adapted under license by your healthcare professional. If you have questions about a medical condition or this instruction, always ask your healthcare professional. [employer] disclaims any warranty or liability for your use of this information.    Learning About Stress  What is stress?     Stress is your body's response to a hard situation. Your body can have a physical, emotional, or mental response. Stress is a fact of life for most people, and it affects everyone differently. What causes stress for you may not be stressful for someone else.  A lot of things can cause stress. You may feel stress when you go on a job interview, take a test, or run a race. This kind of short-term stress is normal and even useful. It can help you if you need to work hard or react quickly. For example, stress can help you finish an important job on time.  Long-term stress is caused by ongoing stressful situations or events. Examples of long-term stress include long-term health problems, ongoing problems at work, or conflicts in your family. Long-term stress can harm your health.  How does stress affect your health?  When you are stressed, your body responds as though you are in danger. It makes hormones that speed up your heart, make you breathe faster, and give you a burst of energy. This is called the fight-or-flight stress response. If the stress is over quickly, your body goes back to normal and no harm is done.  But if stress happens too often or lasts too long, it can have bad effects. Long-term stress can make you more likely to get sick, and it can make symptoms of some diseases worse. If you tense up when you are stressed, you may develop neck, shoulder, or low back pain. " Stress is linked to high blood pressure and heart disease.  Stress also harms your emotional health. It can make you zarco, tense, or depressed. Your relationships may suffer, and you may not do well at work or school.  What can you do to manage stress?  You can try these things to help manage stress:   Do something active. Exercise or activity can help reduce stress. Walking is a great way to get started. Even everyday activities such as housecleaning or yard work can help.  Try yoga or cathy chi. These techniques combine exercise and meditation. You may need some training at first to learn them.  Do something you enjoy. For example, listen to music or go to a movie. Practice your hobby or do volunteer work.  Meditate. This can help you relax, because you are not worrying about what happened before or what may happen in the future.  Do guided imagery. Imagine yourself in any setting that helps you feel calm. You can use online videos, books, or a teacher to guide you.  Do breathing exercises. For example:  From a standing position, bend forward from the waist with your knees slightly bent. Let your arms dangle close to the floor.  Breathe in slowly and deeply as you return to a standing position. Roll up slowly and lift your head last.  Hold your breath for just a few seconds in the standing position.  Breathe out slowly and bend forward from the waist.  Let your feelings out. Talk, laugh, cry, and express anger when you need to. Talking with supportive friends or family, a counselor, or a cee leader about your feelings is a healthy way to relieve stress. Avoid discussing your feelings with people who make you feel worse.  Write. It may help to write about things that are bothering you. This helps you find out how much stress you feel and what is causing it. When you know this, you can find better ways to cope.  What can you do to prevent stress?  You might try some of these things to help prevent stress:  Manage  "your time. This helps you find time to do the things you want and need to do.  Get enough sleep. Your body recovers from the stresses of the day while you are sleeping.  Get support. Your family, friends, and community can make a difference in how you experience stress.  Limit your news feed. Avoid or limit time on social media or news that may make you feel stressed.  Do something active. Exercise or activity can help reduce stress. Walking is a great way to get started.  Where can you learn more?  Go to https://www.[employer].net/patiented  Enter N032 in the search box to learn more about \"Learning About Stress.\"  Current as of: October 24, 2024  Content Version: 14.4    [id number] [employer].   Care instructions adapted under license by your healthcare professional. If you have questions about a medical condition or this instruction, always ask your healthcare professional. [employer] disclaims any warranty or liability for your use of this information.       "

## 2025-05-01 NOTE — RESULT ENCOUNTER NOTE
Celia,    Your A1c was 11.1.  This is consistent with pretty severe diabetes.  Your cholesterol was extremely high.  There is some protein in your urine as well, which indicates that the diabetes is affecting your kidneys somewhat.  I would strongly recommend we start medicine for your diabetes as well as cholesterol.  I would recommend metformin and a statin medication-both are pills that you take once per day.  If you are okay with this, I would send to your pharmacy.  If you would like to discuss more, I can help answer any questions or concerns.  I think that improving your diet and increasing your exercise is very important, but likely will not be enough at this point in time.    Please let me know if you have any questions or concerns.    Dr. Hidalgo

## 2025-05-02 NOTE — RESULT ENCOUNTER NOTE
Celia,    This was a lab test I added on to see if you could possibly have type 1 diabetes instead of type 2 - as you are thin and young and relatively active (even if you're not running) - you may have type 1 diabetes. I'd recommend you make a follow-up appointment with me in a few weeks and we can get some more lab testing to see if your diabetes is actually type 1 - which is treated very differently.     Please let me know if you have any questions or concerns.    Dr. Hidalgo

## 2025-06-05 ENCOUNTER — OFFICE VISIT (OUTPATIENT)
Dept: FAMILY MEDICINE | Facility: CLINIC | Age: 40
End: 2025-06-05
Payer: COMMERCIAL

## 2025-06-05 VITALS
SYSTOLIC BLOOD PRESSURE: 122 MMHG | BODY MASS INDEX: 29.19 KG/M2 | TEMPERATURE: 97.2 F | RESPIRATION RATE: 18 BRPM | DIASTOLIC BLOOD PRESSURE: 78 MMHG | WEIGHT: 171 LBS | HEIGHT: 64 IN | OXYGEN SATURATION: 95 % | HEART RATE: 87 BPM

## 2025-06-05 DIAGNOSIS — E11.9 TYPE 2 DIABETES MELLITUS WITHOUT COMPLICATION, WITHOUT LONG-TERM CURRENT USE OF INSULIN (H): ICD-10-CM

## 2025-06-05 DIAGNOSIS — E78.2 MIXED HYPERLIPIDEMIA: ICD-10-CM

## 2025-06-05 DIAGNOSIS — K13.21 LEUKOPLAKIA OF ORAL MUCOSA: Primary | ICD-10-CM

## 2025-06-05 LAB
ANION GAP SERPL CALCULATED.3IONS-SCNC: 12 MMOL/L (ref 7–15)
BUN SERPL-MCNC: 15.4 MG/DL (ref 6–20)
CALCIUM SERPL-MCNC: 9.8 MG/DL (ref 8.8–10.4)
CHLORIDE SERPL-SCNC: 99 MMOL/L (ref 98–107)
CHOLEST SERPL-MCNC: 189 MG/DL
CREAT SERPL-MCNC: 0.74 MG/DL (ref 0.67–1.17)
EGFRCR SERPLBLD CKD-EPI 2021: >90 ML/MIN/1.73M2
FASTING STATUS PATIENT QL REPORTED: YES
FASTING STATUS PATIENT QL REPORTED: YES
GLUCOSE SERPL-MCNC: 265 MG/DL (ref 70–99)
HCO3 SERPL-SCNC: 27 MMOL/L (ref 22–29)
HDLC SERPL-MCNC: 39 MG/DL
HOLD SPECIMEN: NORMAL
LDLC SERPL CALC-MCNC: ABNORMAL MG/DL
NONHDLC SERPL-MCNC: 150 MG/DL
POTASSIUM SERPL-SCNC: 4.8 MMOL/L (ref 3.4–5.3)
SODIUM SERPL-SCNC: 138 MMOL/L (ref 135–145)
TRIGL SERPL-MCNC: 481 MG/DL

## 2025-06-05 RX ORDER — HYDROCHLOROTHIAZIDE 12.5 MG/1
CAPSULE ORAL
Qty: 6 EACH | Refills: 3 | Status: SHIPPED | OUTPATIENT
Start: 2025-06-05

## 2025-06-05 RX ORDER — METFORMIN HYDROCHLORIDE 500 MG/1
2000 TABLET, EXTENDED RELEASE ORAL
Qty: 120 TABLET | Refills: 3 | Status: SHIPPED | OUTPATIENT
Start: 2025-06-05

## 2025-06-05 RX ORDER — LANCETS
EACH MISCELLANEOUS
Qty: 100 EACH | Refills: 6 | Status: SHIPPED | OUTPATIENT
Start: 2025-06-05

## 2025-06-05 RX ORDER — KETOROLAC TROMETHAMINE 30 MG/ML
1 INJECTION, SOLUTION INTRAMUSCULAR; INTRAVENOUS ONCE
Qty: 1 EACH | Refills: 0 | Status: SHIPPED | OUTPATIENT
Start: 2025-06-05 | End: 2025-06-05

## 2025-06-05 NOTE — ASSESSMENT & PLAN NOTE
Triglycerides over thousand on 5/1, unable to calculate LDL.  We initiated rosuvastatin 20 mg.  No side effects.  Will recheck lipids today.  Discussed possible need to titrate up the dosage.

## 2025-06-05 NOTE — ASSESSMENT & PLAN NOTE
Diagnosed around 2021 with an A1c of 11, improved his A1c to 8 with diet and exercise.  Had insurance lapse and was not as active for a few years.  When he establish care here on 5/1/2025, A1c was 11.1 with mild proteinuria.  I sent in metformin and rosuvastatin (triglycerides were over 1000) and he has been taking these.  Will recheck lipids today.  Recommended diabetes education and CGM-he states that he would rather use a traditional glucometer, and he is not sure he needs diabetes education but I encouraged him to go at least once.  He has not started exercising or making any diet changes yet.  His dad has diabetes that was diagnosed about 5 years ago.  He has several sisters with thyroid disease.  He is quite thin and so I recommended we do a workup to make sure this is not type 1 diabetes and he is in agreement.  I will see him back in 2 months and at that time we will recheck A1c and microalbumin.  Discussed possible need for addition of medications to reach A1c goal of less than 7.  We did discuss the risks of untreated or undertreated diabetes including kidney disease, foot wounds that can lead to amputations, heart attack/stroke, eye disease.  He recently had an eye exam and [facility] was [facility].

## 2025-06-05 NOTE — ASSESSMENT & PLAN NOTE
Evaluated by [facility] last week.  He saw a PA who recommended a steroid paste and could not biopsy it for him.  They set him up with an MD on 6/23.  He is very concerned this is cancer.  He felt like the steroid paste actually made a little worse.

## 2025-06-05 NOTE — PROGRESS NOTES
Assessment & Plan   Problem List Items Addressed This Visit       Type 2 diabetes mellitus without complication, without long-term current use of insulin (H)    Diagnosed around 2021 with an A1c of 11, improved his A1c to 8 with diet and exercise.  Had insurance lapse and was not as active for a few years.  When he establish care here on 5/1/2025, A1c was 11.1 with mild proteinuria.  I sent in metformin and rosuvastatin (triglycerides were over 1000) and he has been taking these.  Will recheck lipids today.  Recommended diabetes education and CGM-he states that he would rather use a traditional glucometer, and he is not sure he needs diabetes education but I encouraged him to go at least once.  He has not started exercising or making any diet changes yet.  His dad has diabetes that was diagnosed about 5 years ago.  He has several sisters with thyroid disease.  He is quite thin and so I recommended we do a workup to make sure this is not type 1 diabetes and he is in agreement.  I will see him back in 2 months and at that time we will recheck A1c and microalbumin.  Discussed possible need for addition of medications to reach A1c goal of less than 7.  We did discuss the risks of untreated or undertreated diabetes including kidney disease, foot wounds that can lead to amputations, heart attack/stroke, eye disease.  He recently had an eye exam and [facility] was [facility].          Relevant Medications    Continuous Glucose  (FREESTYLE NISHA 3 READER) CHRISTINA    Continuous Glucose Sensor (FREESTYLE NISHA 3 PLUS SENSOR) MISC    metFORMIN (GLUCOPHAGE XR) 500 MG 24 hr tablet    blood glucose monitoring (NO BRAND SPECIFIED) meter device kit    blood glucose (NO BRAND SPECIFIED) test strip    thin (NO BRAND SPECIFIED) lancets    Other Relevant Orders    Lipid Profile (Chol, Trig, HDL, LDL calc)    C-peptide    Islet Antigen (IA-2) Autoantibody, Serum    Glutamic acid decarboxylase antibody    Basic metabolic panel   (Ca, Cl, CO2, Creat, Gluc, K, Na, BUN)    Adult Diabetes Education  Referral    Insulin antibody    Leukoplakia of oral mucosa - Primary    Evaluated by [facility] last week.  He saw a PA who recommended a steroid paste and could not biopsy it for him.  They set him up with an MD on 6/23.  He is very concerned this is cancer.  He felt like the steroid paste actually made a little worse.         Mixed hyperlipidemia    Triglycerides over thousand on 5/1, unable to calculate LDL.  We initiated rosuvastatin 20 mg.  No side effects.  Will recheck lipids today.  Discussed possible need to titrate up the dosage.                  The longitudinal plan of care for the diagnosis(es)/condition(s) as documented were addressed during this visit. Due to the added complexity in care, I will continue to support Celia in the subsequent management and with ongoing continuity of care.    Linda Hidalgo MD  [facility]      Subjective   Celia is a 39 year old, presenting for the following health issues:  Follow Up (diabetes)        6/5/2025    10:06 AM   Additional Questions   Roomed by Rachel   Accompanied by wife     History of Present Illness       Diabetes:   He presents for follow up of diabetes.  He is checking home blood glucose a few times a month.   He checks blood glucose before and after meals.  Blood glucose is sometimes over 200 and never under 70.  When his blood glucose is low, the patient is asymptomatic for confusion, blurred vision, lethargy and reports not feeling dizzy, shaky, or weak.   He has no concerns regarding his diabetes at this time.  He is having numbness in feet and redness, sores, or blisters on feet.            Hyperlipidemia:  He presents for follow up of hyperlipidemia.   He is taking medication to lower cholesterol. He is not having myalgia or other side effects to statin medications.    Hypertension: He presents for follow up of hypertension.  He does not check  "blood pressure  regularly outside of the clinic. Outpatient blood pressures have not been over 140/90. He does not follow a low salt diet.     He eats 2-3 servings of fruits and vegetables daily.He consumes 2 sweetened beverage(s) daily.He exercises with enough effort to increase his heart rate 9 or less minutes per day.  He exercises with enough effort to increase his heart rate 3 or less days per week.   He is taking medications regularly.        Last seen by me on 5/2, diagnosed with diabetes (A1c of 11.1, proteinuria ) and started metformin  Should be on 2000 mg once daily  Also started rosuvastatin - fasting triglycerides of 1020, LDL couldn't calculate  Testing for type 1 diabetes?  Insulin level was 5.8 (low/normal)    A little bit nauseous only a couple of times over the past month, no diarrhea    When exercising - will gain weight - maybe because hungrier?    Checks blood sugars in the past  Did diabetes education    Saw ENT through [facility] - saw a PA - now has appointment with MD - June 20?  Used a steroid paste and it got worse after a week    Fishing in [place] this weekend      Objective    /78 (BP Location: Right arm, Patient Position: Sitting, Cuff Size: Adult Regular)   Pulse 87   Temp 97.2  F (36.2  C) (Temporal)   Resp 18   Ht 1.626 m (5' 4.02\")   Wt 77.6 kg (171 lb)   SpO2 95%   BMI 29.34 kg/m    Body mass index is 29.34 kg/m .  Physical Exam   GEN: Patient sitting comfortably in no acute distress.  HEEN: Head is atraumatic, normocephalic, eyes anicteric, mucous membranes moist.  CV: Regular rate and rhythm without obvious murmurs.  PULM: Clear to auscultation bilaterally without wheezing or rales.  ABD: Soft, nontender, bowel sounds present.  NEURO: Alert and oriented x3.  No focal motor abnormalities.  Face symmetric.  PSYCH: Appropriate affect.  SKIN: No rashes, bruising, or other lesions.        Signed Electronically by: Linda Hidalgo MD  Prior to immunization " administration, verified patients identity using patient s name and date of birth. Please see Immunization Activity for additional information.     Screening Questionnaire for Adult Immunization    Are you sick today?   No   Do you have allergies to medications, food, a vaccine component or latex?   No   Have you ever had a serious reaction after receiving a vaccination?   No   Do you have a long-term health problem with heart, lung, kidney, or metabolic disease (e.g., diabetes), asthma, a blood disorder, no spleen, complement component deficiency, a cochlear implant, or a spinal fluid leak?  Are you on long-term aspirin therapy?   Yes   Do you have cancer, leukemia, HIV/AIDS, or any other immune system problem?   No   Do you have a parent, brother, or sister with an immune system problem?   No   In the past 3 months, have you taken medications that affect  your immune system, such as prednisone, other steroids, or anticancer drugs; drugs for the treatment of rheumatoid arthritis, Crohn s disease, or psoriasis; or have you had radiation treatments?   No   Have you had a seizure, or a brain or other nervous system problem?   No   During the past year, have you received a transfusion of blood or blood    products, or been given immune (gamma) globulin or antiviral drug?   No   For women: Are you pregnant or is there a chance you could become       pregnant during the next month?   No   Have you received any vaccinations in the past 4 weeks?   No     Immunization questionnaire was positive for at least one answer.  Notified Dr Dash.      Patient instructed to remain in clinic for 15 minutes afterwards, and to report any adverse reactions.     Screening performed by Rachel Huggins on 6/5/2025 at 10:12 AM.

## 2025-06-06 ENCOUNTER — RESULTS FOLLOW-UP (OUTPATIENT)
Dept: FAMILY MEDICINE | Facility: CLINIC | Age: 40
End: 2025-06-06

## 2025-06-07 LAB — GAD65 AB SER IA-ACNC: <5 IU/ML

## 2025-06-09 ENCOUNTER — PATIENT OUTREACH (OUTPATIENT)
Dept: CARE COORDINATION | Facility: CLINIC | Age: 40
End: 2025-06-09
Payer: COMMERCIAL

## 2025-06-09 DIAGNOSIS — E11.9 TYPE 2 DIABETES MELLITUS WITHOUT COMPLICATION, WITHOUT LONG-TERM CURRENT USE OF INSULIN (H): ICD-10-CM

## 2025-06-09 LAB — ISLET CELL512 AB SER IA-ACNC: <5.4 U/ML

## 2025-06-09 RX ORDER — METFORMIN HYDROCHLORIDE 500 MG/1
TABLET, EXTENDED RELEASE ORAL
Qty: 162 TABLET | OUTPATIENT
Start: 2025-06-09

## 2025-06-09 RX ORDER — METFORMIN HYDROCHLORIDE 500 MG/1
2000 TABLET, EXTENDED RELEASE ORAL
Qty: 360 TABLET | OUTPATIENT
Start: 2025-06-09

## 2025-06-16 LAB — INSULIN AB SER IA-ACNC: <0.4 U/ML

## 2025-07-05 ENCOUNTER — MYC REFILL (OUTPATIENT)
Dept: FAMILY MEDICINE | Facility: CLINIC | Age: 40
End: 2025-07-05
Payer: COMMERCIAL

## 2025-07-05 DIAGNOSIS — E11.9 TYPE 2 DIABETES MELLITUS WITHOUT COMPLICATION, WITHOUT LONG-TERM CURRENT USE OF INSULIN (H): ICD-10-CM

## 2025-07-05 DIAGNOSIS — E78.1 HYPERTRIGLYCERIDEMIA: ICD-10-CM

## 2025-07-05 RX ORDER — ROSUVASTATIN CALCIUM 20 MG/1
20 TABLET, COATED ORAL DAILY
Qty: 90 TABLET | Refills: 1 | OUTPATIENT
Start: 2025-07-05

## 2025-07-09 ENCOUNTER — MYC REFILL (OUTPATIENT)
Dept: FAMILY MEDICINE | Facility: CLINIC | Age: 40
End: 2025-07-09
Payer: COMMERCIAL

## 2025-07-09 DIAGNOSIS — E11.9 TYPE 2 DIABETES MELLITUS WITHOUT COMPLICATION, WITHOUT LONG-TERM CURRENT USE OF INSULIN (H): ICD-10-CM

## 2025-07-09 DIAGNOSIS — E78.1 HYPERTRIGLYCERIDEMIA: ICD-10-CM

## 2025-07-09 RX ORDER — ROSUVASTATIN CALCIUM 40 MG/1
40 TABLET, COATED ORAL DAILY
Qty: 90 TABLET | Refills: 3 | Status: SHIPPED | OUTPATIENT
Start: 2025-07-09

## 2025-08-10 ENCOUNTER — HEALTH MAINTENANCE LETTER (OUTPATIENT)
Age: 40
End: 2025-08-10

## (undated) DEVICE — SYR 10ML FINGER CONTROL W/O NDL 309695

## (undated) DEVICE — JELLY LUBRICATING SURGILUBE 2OZ TUBE

## (undated) DEVICE — TRAY PREP DRY SKIN SCRUB 067

## (undated) DEVICE — NEEDLE HYPO 18X1-1/2 SAFETY 305918

## (undated) DEVICE — ESU GROUND PAD ADULT REM W/15' CORD E7507DB

## (undated) DEVICE — SUCTION TIP YANKAUER W/O VENT K86

## (undated) DEVICE — SYR 10ML LL W/O NDL 302995

## (undated) DEVICE — BRIEF STRETCH XL MPS40

## (undated) DEVICE — TAPE ADH POROUS 3IN CURITY STD 7046C

## (undated) DEVICE — GOWN LG DISP 9515

## (undated) DEVICE — SOL NACL 0.9% IRRIG 1000ML BOTTLE 2F7124

## (undated) DEVICE — ESU PENCIL SMOKE EVAC W/ROCKER SWITCH 0703-047-000

## (undated) DEVICE — DECANTER VIAL 2006S

## (undated) DEVICE — PREP POVIDONE-IODINE 10% SOLUTION 4OZ BOTTLE MDS093944

## (undated) DEVICE — GLOVE UNDER INDICATOR PI SZ 7.0 LF 41670

## (undated) DEVICE — NEEDLE HYPO MAGELLAN SAFETY 22GA 1 1/2IN 8881850215

## (undated) DEVICE — CUSTOM PACK GEN MAJOR SBA5BGMHEA

## (undated) DEVICE — SOL WATER IRRIG 1000ML BOTTLE 2F7114

## (undated) DEVICE — SUCTION MANIFOLD NEPTUNE 2 SYS 1 PORT 702-025-000

## (undated) DEVICE — CAUTERY BLADE NEEDLE 1IN COATED E1452

## (undated) DEVICE — GLOVE PI ULTRATCH M LF SZ 6.5 PF CUFF TEXT STRL LF 42665

## (undated) DEVICE — DRSG KERLIX FLUFFS X5

## (undated) DEVICE — DRAPE OR LAPAROTOMY KC 89228*

## (undated) DEVICE — TAPE TRANSPORE 2"X1.5YD 1534S-2

## (undated) DEVICE — SU VICRYL+ 3-0 27IN SH UND VCP416H

## (undated) DEVICE — BLADE KNIFE SURG 15 371115

## (undated) RX ORDER — LIDOCAINE HYDROCHLORIDE 10 MG/ML
INJECTION, SOLUTION EPIDURAL; INFILTRATION; INTRACAUDAL; PERINEURAL
Status: DISPENSED
Start: 2023-12-30

## (undated) RX ORDER — ONDANSETRON 2 MG/ML
INJECTION INTRAMUSCULAR; INTRAVENOUS
Status: DISPENSED
Start: 2023-12-30

## (undated) RX ORDER — PROPOFOL 10 MG/ML
INJECTION, EMULSION INTRAVENOUS
Status: DISPENSED
Start: 2023-12-30

## (undated) RX ORDER — BUPIVACAINE HYDROCHLORIDE 2.5 MG/ML
INJECTION, SOLUTION EPIDURAL; INFILTRATION; INTRACAUDAL
Status: DISPENSED
Start: 2023-12-30

## (undated) RX ORDER — DEXAMETHASONE SODIUM PHOSPHATE 10 MG/ML
INJECTION, SOLUTION INTRAMUSCULAR; INTRAVENOUS
Status: DISPENSED
Start: 2023-12-30

## (undated) RX ORDER — FENTANYL CITRATE 50 UG/ML
INJECTION, SOLUTION INTRAMUSCULAR; INTRAVENOUS
Status: DISPENSED
Start: 2023-12-30